# Patient Record
Sex: FEMALE | Race: WHITE | Employment: OTHER | URBAN - METROPOLITAN AREA
[De-identification: names, ages, dates, MRNs, and addresses within clinical notes are randomized per-mention and may not be internally consistent; named-entity substitution may affect disease eponyms.]

---

## 2017-02-23 ENCOUNTER — OFFICE VISIT (OUTPATIENT)
Dept: ORTHOPEDIC SURGERY | Age: 42
End: 2017-02-23

## 2017-02-23 DIAGNOSIS — S52.532A CLOSED COLLES' FRACTURE OF LEFT RADIUS, INITIAL ENCOUNTER: ICD-10-CM

## 2017-02-23 DIAGNOSIS — M25.532 WRIST PAIN, LEFT: Primary | ICD-10-CM

## 2017-02-23 DIAGNOSIS — G56.02 LEFT CARPAL TUNNEL SYNDROME: ICD-10-CM

## 2017-02-23 PROCEDURE — 99203 OFFICE O/P NEW LOW 30 MIN: CPT | Performed by: ORTHOPAEDIC SURGERY

## 2017-02-23 PROCEDURE — 73110 X-RAY EXAM OF WRIST: CPT | Performed by: ORTHOPAEDIC SURGERY

## 2017-02-26 RX ORDER — SODIUM CHLORIDE, SODIUM LACTATE, POTASSIUM CHLORIDE, CALCIUM CHLORIDE 600; 310; 30; 20 MG/100ML; MG/100ML; MG/100ML; MG/100ML
INJECTION, SOLUTION INTRAVENOUS CONTINUOUS
Status: CANCELLED | OUTPATIENT
Start: 2017-02-26

## 2017-02-26 RX ORDER — LIDOCAINE HYDROCHLORIDE 10 MG/ML
2 INJECTION, SOLUTION INFILTRATION; PERINEURAL
Status: CANCELLED | OUTPATIENT
Start: 2017-02-26 | End: 2017-02-26

## 2017-02-27 ENCOUNTER — HOSPITAL ENCOUNTER (OUTPATIENT)
Dept: OTHER | Age: 42
Discharge: HOME | End: 2017-02-27
Attending: FAMILY MEDICINE | Admitting: FAMILY MEDICINE

## 2017-02-27 ENCOUNTER — HOSPITAL ENCOUNTER (OUTPATIENT)
Dept: SURGERY | Age: 42
Discharge: HOME | End: 2017-02-27
Attending: ORTHOPAEDIC SURGERY | Admitting: ORTHOPAEDIC SURGERY

## 2017-02-27 LAB
ALBUMIN SERPL-MCNC: 4 G/DL (ref 3.4–5)
ANION GAP SERPL CALCULATED.3IONS-SCNC: 15 MMOL/L (ref 3–16)
BUN BLDV-MCNC: 5 MG/DL (ref 7–20)
CALCIUM SERPL-MCNC: 9.1 MG/DL (ref 8.3–10.6)
CHLORIDE BLD-SCNC: 98 MMOL/L (ref 99–110)
CHOLESTEROL, TOTAL: 283 MG/DL (ref 0–199)
CO2: 25 MMOL/L (ref 21–32)
CREAT SERPL-MCNC: <0.5 MG/DL (ref 0.6–1.1)
CREATININE URINE: 68.9 MG/DL (ref 28–259)
GFR AFRICAN AMERICAN: >60
GFR NON-AFRICAN AMERICAN: >60
GLUCOSE BLD-MCNC: 349 MG/DL (ref 70–99)
HDLC SERPL-MCNC: 33 MG/DL (ref 40–60)
LDL CHOLESTEROL CALCULATED: ABNORMAL MG/DL
LDL CHOLESTEROL DIRECT: 189 MG/DL
MICROALBUMIN UR-MCNC: 5.3 MG/DL
MICROALBUMIN/CREAT UR-RTO: 76.9 MG/G (ref 0–30)
PHOSPHORUS: 3.4 MG/DL (ref 2.5–4.9)
POTASSIUM SERPL-SCNC: 3.7 MMOL/L (ref 3.5–5.1)
SODIUM BLD-SCNC: 138 MMOL/L (ref 136–145)
TRIGL SERPL-MCNC: 527 MG/DL (ref 0–150)
VLDLC SERPL CALC-MCNC: ABNORMAL MG/DL

## 2017-02-27 RX ORDER — OXYCODONE HYDROCHLORIDE AND ACETAMINOPHEN 5; 325 MG/1; MG/1
1 TABLET ORAL EVERY 6 HOURS PRN
Qty: 30 TABLET | Refills: 0 | Status: SHIPPED | OUTPATIENT
Start: 2017-02-27 | End: 2017-03-06

## 2017-02-28 LAB
ESTIMATED AVERAGE GLUCOSE: 309.2 MG/DL
HBA1C MFR BLD: 12.4 %

## 2017-03-01 ENCOUNTER — TELEPHONE (OUTPATIENT)
Dept: ORTHOPEDIC SURGERY | Age: 42
End: 2017-03-01

## 2017-03-01 ENCOUNTER — HOSPITAL ENCOUNTER (OUTPATIENT)
Dept: SURGERY | Age: 42
Discharge: HOME | End: 2017-03-01
Attending: ORTHOPAEDIC SURGERY | Admitting: ORTHOPAEDIC SURGERY

## 2017-03-01 VITALS
OXYGEN SATURATION: 94 % | SYSTOLIC BLOOD PRESSURE: 146 MMHG | TEMPERATURE: 97.5 F | BODY MASS INDEX: 38.27 KG/M2 | HEIGHT: 63 IN | DIASTOLIC BLOOD PRESSURE: 102 MMHG | WEIGHT: 216 LBS | RESPIRATION RATE: 12 BRPM | HEART RATE: 104 BPM

## 2017-03-01 LAB
GLUCOSE BLD-MCNC: 252 MG/DL (ref 70–99)
GLUCOSE BLD-MCNC: 300 MG/DL (ref 70–99)
PERFORMED ON: ABNORMAL
PERFORMED ON: ABNORMAL

## 2017-03-01 RX ORDER — LIDOCAINE HYDROCHLORIDE 10 MG/ML
2 INJECTION, SOLUTION INFILTRATION; PERINEURAL
Status: DISCONTINUED | OUTPATIENT
Start: 2017-03-01 | End: 2017-03-01

## 2017-03-01 RX ORDER — SODIUM CHLORIDE, SODIUM LACTATE, POTASSIUM CHLORIDE, CALCIUM CHLORIDE 600; 310; 30; 20 MG/100ML; MG/100ML; MG/100ML; MG/100ML
INJECTION, SOLUTION INTRAVENOUS CONTINUOUS
Status: DISCONTINUED | OUTPATIENT
Start: 2017-03-01 | End: 2017-03-01

## 2017-03-01 RX ORDER — SODIUM CHLORIDE 0.9 % (FLUSH) 0.9 %
10 SYRINGE (ML) INJECTION EVERY 12 HOURS SCHEDULED
Status: DISCONTINUED | OUTPATIENT
Start: 2017-03-01 | End: 2017-03-02 | Stop reason: HOSPADM

## 2017-03-01 RX ORDER — ONDANSETRON 4 MG/1
4 TABLET, FILM COATED ORAL EVERY 8 HOURS PRN
Qty: 30 TABLET | Refills: 2 | Status: SHIPPED | OUTPATIENT
Start: 2017-03-01 | End: 2018-01-22

## 2017-03-01 RX ORDER — MEPERIDINE HYDROCHLORIDE 50 MG/ML
12.5 INJECTION INTRAMUSCULAR; INTRAVENOUS; SUBCUTANEOUS EVERY 5 MIN PRN
Status: DISCONTINUED | OUTPATIENT
Start: 2017-03-01 | End: 2017-03-02 | Stop reason: HOSPADM

## 2017-03-01 RX ORDER — LIDOCAINE HYDROCHLORIDE 10 MG/ML
1 INJECTION, SOLUTION EPIDURAL; INFILTRATION; INTRACAUDAL; PERINEURAL
Status: ACTIVE | OUTPATIENT
Start: 2017-03-01 | End: 2017-03-01

## 2017-03-01 RX ORDER — OXYCODONE HYDROCHLORIDE AND ACETAMINOPHEN 5; 325 MG/1; MG/1
1-2 TABLET ORAL EVERY 6 HOURS PRN
Qty: 40 TABLET | Refills: 0 | Status: SHIPPED | OUTPATIENT
Start: 2017-03-01 | End: 2017-03-08

## 2017-03-01 RX ORDER — OXYCODONE HYDROCHLORIDE AND ACETAMINOPHEN 5; 325 MG/1; MG/1
1 TABLET ORAL PRN
Status: COMPLETED | OUTPATIENT
Start: 2017-03-01 | End: 2017-03-01

## 2017-03-01 RX ORDER — OXYCODONE HYDROCHLORIDE AND ACETAMINOPHEN 5; 325 MG/1; MG/1
2 TABLET ORAL PRN
Status: COMPLETED | OUTPATIENT
Start: 2017-03-01 | End: 2017-03-01

## 2017-03-01 RX ORDER — HYDRALAZINE HYDROCHLORIDE 20 MG/ML
5 INJECTION INTRAMUSCULAR; INTRAVENOUS EVERY 10 MIN PRN
Status: DISCONTINUED | OUTPATIENT
Start: 2017-03-01 | End: 2017-03-02 | Stop reason: HOSPADM

## 2017-03-01 RX ORDER — ONDANSETRON 2 MG/ML
4 INJECTION INTRAMUSCULAR; INTRAVENOUS EVERY 10 MIN PRN
Status: DISCONTINUED | OUTPATIENT
Start: 2017-03-01 | End: 2017-03-02 | Stop reason: HOSPADM

## 2017-03-01 RX ORDER — SODIUM CHLORIDE, SODIUM LACTATE, POTASSIUM CHLORIDE, CALCIUM CHLORIDE 600; 310; 30; 20 MG/100ML; MG/100ML; MG/100ML; MG/100ML
INJECTION, SOLUTION INTRAVENOUS CONTINUOUS
Status: DISCONTINUED | OUTPATIENT
Start: 2017-03-01 | End: 2017-03-02 | Stop reason: HOSPADM

## 2017-03-01 RX ORDER — SODIUM CHLORIDE 0.9 % (FLUSH) 0.9 %
10 SYRINGE (ML) INJECTION PRN
Status: DISCONTINUED | OUTPATIENT
Start: 2017-03-01 | End: 2017-03-02 | Stop reason: HOSPADM

## 2017-03-01 RX ORDER — LABETALOL HYDROCHLORIDE 5 MG/ML
5 INJECTION, SOLUTION INTRAVENOUS EVERY 10 MIN PRN
Status: DISCONTINUED | OUTPATIENT
Start: 2017-03-01 | End: 2017-03-02 | Stop reason: HOSPADM

## 2017-03-01 RX ADMIN — Medication 0.25 MG: at 13:49

## 2017-03-01 RX ADMIN — Medication 0.25 MG: at 12:38

## 2017-03-01 RX ADMIN — SODIUM CHLORIDE, SODIUM LACTATE, POTASSIUM CHLORIDE, CALCIUM CHLORIDE: 600; 310; 30; 20 INJECTION, SOLUTION INTRAVENOUS at 10:21

## 2017-03-01 RX ADMIN — Medication 0.25 MG: at 12:43

## 2017-03-01 RX ADMIN — Medication 0.25 MG: at 13:14

## 2017-03-01 RX ADMIN — OXYCODONE HYDROCHLORIDE AND ACETAMINOPHEN 2 TABLET: 5; 325 TABLET ORAL at 14:01

## 2017-03-01 ASSESSMENT — PAIN SCALES - GENERAL
PAINLEVEL_OUTOF10: 6
PAINLEVEL_OUTOF10: 7
PAINLEVEL_OUTOF10: 8
PAINLEVEL_OUTOF10: 6

## 2017-03-01 ASSESSMENT — PAIN - FUNCTIONAL ASSESSMENT: PAIN_FUNCTIONAL_ASSESSMENT: 0-10

## 2017-03-02 DIAGNOSIS — S52.532A CLOSED COLLES' FRACTURE OF LEFT RADIUS, INITIAL ENCOUNTER: Primary | ICD-10-CM

## 2017-03-02 DIAGNOSIS — G56.02 LEFT CARPAL TUNNEL SYNDROME: ICD-10-CM

## 2017-03-09 ENCOUNTER — OFFICE VISIT (OUTPATIENT)
Dept: ORTHOPEDIC SURGERY | Age: 42
End: 2017-03-09

## 2017-03-09 ENCOUNTER — HOSPITAL ENCOUNTER (OUTPATIENT)
Dept: OCCUPATIONAL THERAPY | Age: 42
Discharge: HOME | End: 2017-02-28
Admitting: ORTHOPAEDIC SURGERY

## 2017-03-09 ENCOUNTER — HOSPITAL ENCOUNTER (OUTPATIENT)
Dept: OCCUPATIONAL THERAPY | Age: 42
Discharge: HOME | End: 2017-03-09
Admitting: ORTHOPAEDIC SURGERY

## 2017-03-09 DIAGNOSIS — S52.532S CLOSED COLLES' FRACTURE OF LEFT RADIUS, SEQUELA: Primary | ICD-10-CM

## 2017-03-09 PROCEDURE — 73110 X-RAY EXAM OF WRIST: CPT | Performed by: ORTHOPAEDIC SURGERY

## 2017-03-09 PROCEDURE — 99024 POSTOP FOLLOW-UP VISIT: CPT | Performed by: ORTHOPAEDIC SURGERY

## 2017-03-09 RX ORDER — CEPHALEXIN 500 MG/1
500 CAPSULE ORAL 4 TIMES DAILY
Qty: 28 CAPSULE | Refills: 0 | Status: SHIPPED | OUTPATIENT
Start: 2017-03-09 | End: 2017-03-16

## 2017-03-09 RX ORDER — OXYCODONE HYDROCHLORIDE AND ACETAMINOPHEN 5; 325 MG/1; MG/1
1 TABLET ORAL EVERY 6 HOURS PRN
Qty: 30 TABLET | Refills: 0 | Status: SHIPPED | OUTPATIENT
Start: 2017-03-09 | End: 2017-03-16

## 2017-04-06 ENCOUNTER — TELEPHONE (OUTPATIENT)
Dept: ORTHOPEDIC SURGERY | Age: 42
End: 2017-04-06

## 2017-04-11 ENCOUNTER — OFFICE VISIT (OUTPATIENT)
Dept: ORTHOPEDIC SURGERY | Age: 42
End: 2017-04-11

## 2017-04-11 VITALS
BODY MASS INDEX: 37.73 KG/M2 | SYSTOLIC BLOOD PRESSURE: 138 MMHG | WEIGHT: 212.96 LBS | DIASTOLIC BLOOD PRESSURE: 79 MMHG | HEIGHT: 63 IN

## 2017-04-11 DIAGNOSIS — S52.532S CLOSED COLLES' FRACTURE OF LEFT RADIUS, SEQUELA: Primary | ICD-10-CM

## 2017-04-11 PROCEDURE — 73110 X-RAY EXAM OF WRIST: CPT | Performed by: ORTHOPAEDIC SURGERY

## 2017-04-11 PROCEDURE — 99024 POSTOP FOLLOW-UP VISIT: CPT | Performed by: ORTHOPAEDIC SURGERY

## 2017-04-11 RX ORDER — IBUPROFEN 600 MG/1
600 TABLET ORAL EVERY 6 HOURS PRN
Qty: 60 TABLET | Refills: 2 | Status: SHIPPED | OUTPATIENT
Start: 2017-04-11 | End: 2018-01-22

## 2018-06-05 PROBLEM — E10.10 DKA, TYPE 1, NOT AT GOAL (HCC): Status: ACTIVE | Noted: 2018-06-05

## 2018-06-05 PROBLEM — J18.9 PNA (PNEUMONIA): Status: ACTIVE | Noted: 2018-06-05

## 2018-07-13 ENCOUNTER — HOSPITAL ENCOUNTER (OUTPATIENT)
Dept: MAMMOGRAPHY | Age: 43
Discharge: HOME | End: 2018-07-14
Attending: FAMILY MEDICINE | Admitting: FAMILY MEDICINE

## 2018-07-13 DIAGNOSIS — Z12.31 VISIT FOR SCREENING MAMMOGRAM: ICD-10-CM

## 2018-09-24 ENCOUNTER — HOSPITAL ENCOUNTER (OUTPATIENT)
Dept: MRI IMAGING | Age: 43
Discharge: HOME | End: 2018-09-24
Payer: COMMERCIAL

## 2018-09-24 DIAGNOSIS — M54.41 ACUTE BACK PAIN WITH SCIATICA, RIGHT: ICD-10-CM

## 2018-09-24 DIAGNOSIS — G89.29 CHRONIC BILATERAL LOW BACK PAIN WITH BILATERAL SCIATICA: ICD-10-CM

## 2018-09-24 DIAGNOSIS — M54.42 CHRONIC BILATERAL LOW BACK PAIN WITH BILATERAL SCIATICA: ICD-10-CM

## 2018-09-24 DIAGNOSIS — M54.41 CHRONIC LOW BACK PAIN WITH BILATERAL SCIATICA, UNSPECIFIED BACK PAIN LATERALITY: ICD-10-CM

## 2018-09-24 DIAGNOSIS — M54.42 CHRONIC LOW BACK PAIN WITH BILATERAL SCIATICA, UNSPECIFIED BACK PAIN LATERALITY: ICD-10-CM

## 2018-09-24 DIAGNOSIS — G89.29 CHRONIC LOW BACK PAIN WITH BILATERAL SCIATICA, UNSPECIFIED BACK PAIN LATERALITY: ICD-10-CM

## 2018-09-24 DIAGNOSIS — M54.41 CHRONIC BILATERAL LOW BACK PAIN WITH BILATERAL SCIATICA: ICD-10-CM

## 2018-09-24 PROCEDURE — 72148 MRI LUMBAR SPINE W/O DYE: CPT

## 2018-09-30 ENCOUNTER — APPOINTMENT (OUTPATIENT)
Dept: CT IMAGING | Age: 43
End: 2018-09-30
Payer: COMMERCIAL

## 2018-09-30 ENCOUNTER — HOSPITAL ENCOUNTER (EMERGENCY)
Age: 43
Discharge: HOME | End: 2018-09-30
Payer: COMMERCIAL

## 2018-09-30 VITALS
SYSTOLIC BLOOD PRESSURE: 128 MMHG | OXYGEN SATURATION: 100 % | DIASTOLIC BLOOD PRESSURE: 84 MMHG | TEMPERATURE: 98.2 F | BODY MASS INDEX: 38.27 KG/M2 | HEART RATE: 70 BPM | HEIGHT: 63 IN | RESPIRATION RATE: 16 BRPM | WEIGHT: 216 LBS

## 2018-09-30 DIAGNOSIS — L03.319 CELLULITIS OF PUBIC REGION: Primary | ICD-10-CM

## 2018-09-30 PROCEDURE — 99283 EMERGENCY DEPT VISIT LOW MDM: CPT

## 2018-09-30 PROCEDURE — 6360000002 HC RX W HCPCS: Performed by: PHYSICIAN ASSISTANT

## 2018-09-30 PROCEDURE — 72192 CT PELVIS W/O DYE: CPT

## 2018-09-30 PROCEDURE — 6370000000 HC RX 637 (ALT 250 FOR IP): Performed by: PHYSICIAN ASSISTANT

## 2018-09-30 RX ORDER — HYDROCODONE BITARTRATE AND ACETAMINOPHEN 5; 325 MG/1; MG/1
1 TABLET ORAL EVERY 6 HOURS PRN
Qty: 10 TABLET | Refills: 0 | Status: SHIPPED | OUTPATIENT
Start: 2018-09-30 | End: 2018-10-07

## 2018-09-30 RX ORDER — DOXYCYCLINE HYCLATE 100 MG
100 TABLET ORAL ONCE
Status: COMPLETED | OUTPATIENT
Start: 2018-09-30 | End: 2018-09-30

## 2018-09-30 RX ORDER — ONDANSETRON 4 MG/1
4 TABLET, ORALLY DISINTEGRATING ORAL ONCE
Status: COMPLETED | OUTPATIENT
Start: 2018-09-30 | End: 2018-09-30

## 2018-09-30 RX ORDER — DOXYCYCLINE 100 MG/1
100 TABLET ORAL 2 TIMES DAILY
Qty: 20 TABLET | Refills: 0 | Status: SHIPPED | OUTPATIENT
Start: 2018-09-30 | End: 2018-10-10

## 2018-09-30 RX ORDER — PROMETHAZINE HYDROCHLORIDE 25 MG/1
25 TABLET ORAL EVERY 6 HOURS PRN
Qty: 20 TABLET | Refills: 0 | Status: SHIPPED | OUTPATIENT
Start: 2018-09-30 | End: 2018-10-07

## 2018-09-30 RX ORDER — HYDROCODONE BITARTRATE AND ACETAMINOPHEN 5; 325 MG/1; MG/1
1 TABLET ORAL ONCE
Status: COMPLETED | OUTPATIENT
Start: 2018-09-30 | End: 2018-09-30

## 2018-09-30 RX ADMIN — DOXYCYCLINE HYCLATE 100 MG: 100 TABLET, COATED ORAL at 22:48

## 2018-09-30 RX ADMIN — ONDANSETRON 4 MG: 4 TABLET, ORALLY DISINTEGRATING ORAL at 20:42

## 2018-09-30 RX ADMIN — HYDROCODONE BITARTRATE AND ACETAMINOPHEN 1 TABLET: 5; 325 TABLET ORAL at 20:42

## 2018-09-30 ASSESSMENT — PAIN SCALES - GENERAL: PAINLEVEL_OUTOF10: 8

## 2018-10-03 ASSESSMENT — ENCOUNTER SYMPTOMS
NAUSEA: 0
DIARRHEA: 0
ABDOMINAL PAIN: 0
CONSTIPATION: 0
COUGH: 0
BACK PAIN: 0
EYE PAIN: 0
CHEST TIGHTNESS: 0
ROS SKIN COMMENTS: LESION
FACIAL SWELLING: 0
RHINORRHEA: 0
SORE THROAT: 0
SHORTNESS OF BREATH: 0
EYE REDNESS: 0

## 2018-10-09 ENCOUNTER — INITIAL CONSULT (OUTPATIENT)
Dept: SURGERY | Age: 43
End: 2018-10-09
Payer: COMMERCIAL

## 2018-10-09 ENCOUNTER — PREP FOR PROCEDURE (OUTPATIENT)
Dept: SURGERY | Age: 43
End: 2018-10-09

## 2018-10-09 VITALS
SYSTOLIC BLOOD PRESSURE: 124 MMHG | WEIGHT: 209 LBS | DIASTOLIC BLOOD PRESSURE: 76 MMHG | BODY MASS INDEX: 37.03 KG/M2 | HEIGHT: 63 IN

## 2018-10-09 DIAGNOSIS — M79.89 SOFT TISSUE MASS: Primary | ICD-10-CM

## 2018-10-09 PROCEDURE — 99242 OFF/OP CONSLTJ NEW/EST SF 20: CPT | Performed by: SURGERY

## 2018-10-09 PROCEDURE — G8417 CALC BMI ABV UP PARAM F/U: HCPCS | Performed by: SURGERY

## 2018-10-09 PROCEDURE — G8484 FLU IMMUNIZE NO ADMIN: HCPCS | Performed by: SURGERY

## 2018-10-09 PROCEDURE — G8427 DOCREV CUR MEDS BY ELIG CLIN: HCPCS | Performed by: SURGERY

## 2018-10-09 RX ORDER — SODIUM CHLORIDE 0.9 % (FLUSH) 0.9 %
10 SYRINGE (ML) INJECTION PRN
Status: CANCELLED | OUTPATIENT
Start: 2018-10-09 | End: 2019-10-09

## 2018-10-09 RX ORDER — SODIUM CHLORIDE 0.9 % (FLUSH) 0.9 %
10 SYRINGE (ML) INJECTION EVERY 12 HOURS SCHEDULED
Status: CANCELLED | OUTPATIENT
Start: 2018-10-09 | End: 2019-10-09

## 2018-10-09 NOTE — PROGRESS NOTES
124/76   Weight: 209 lb (94.8 kg)   Height: 5' 2.99\" (1.6 m)     Body mass index is 37.03 kg/m². Wt Readings from Last 3 Encounters:   10/09/18 209 lb (94.8 kg)   09/30/18 216 lb (98 kg)   06/08/18 195 lb 6 oz (88.6 kg)     BP Readings from Last 3 Encounters:   10/09/18 124/76   09/30/18 128/84   06/08/18 (!) 153/90        ROS:  As per HPI, otherwise reviewed ×10 systems and negative    PE:  Constitutional:  Well developed, well nourished, no acute distress, non-toxic appearance   Eyes:  PERRL, conjunctiva normal   HENT:  Atraumatic, external ears normal, nose normal. Neck- normal range of motion, no tenderness, supple   Respiratory:  No respiratory distress, normal breath sounds, no rales, no wheezing   Cardiovascular:  Normal rate, normal rhythm  Integument:  Pubic area demonstrates a 2 x 4 cm firm, mobile soft tissue mass  Lymphatic:  No lymphadenopathy noted   Neurologic:  Alert & oriented x 3, no focal deficits noted   Psychiatric:  Speech and behavior appropriate       DATA:  Radiology Review:  CT images reviewed and show  Findings consistent with cellulitis along the rightward aspect of the mons   pubis.  Patchy areas in the involved subcutaneous soft tissues measuring up   to 19 x 17 x 8 mm, and may represent phlegmons or early abscess formation. Assessment:  1. 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 We will plan for excision of the mass. I explained the procedure including risks, benefits, and alternatives. Questions were answered and the patient agrees to proceed.

## 2018-10-15 ENCOUNTER — ANESTHESIA EVENT (OUTPATIENT)
Dept: OPERATING ROOM | Age: 43
End: 2018-10-15
Payer: COMMERCIAL

## 2018-10-15 NOTE — ANESTHESIA PRE PROCEDURE
And Related Hives    Risperidone And Related      hallucinate    Ibuprofen Nausea And Vomiting     11/1/2012-pt reports she gets hives and vomits when she takes it.     Ketorolac Tromethamine Other (See Comments), Hives and Nausea And Vomiting    Nsaids Hives and Nausea And Vomiting     11/2/12 PT STATES SHE GETS HIVES AND VOMITS WHEN SHE TAKES IT    Ultram [Tramadol] Hives, Nausea And Vomiting and Nausea Only       Social History   Substance Use Topics    Smoking status: Never Smoker    Smokeless tobacco: Never Used    Alcohol use No       LABS:    CBC  Lab Results   Component Value Date/Time    WBC 6.5 06/07/2018 05:42 AM    HGB 11.2 (L) 06/07/2018 05:42 AM    HCT 32.8 (L) 06/07/2018 05:42 AM     06/07/2018 05:42 AM     RENAL  Lab Results   Component Value Date/Time     10/16/2018 12:27 PM    K 3.5 10/16/2018 12:27 PM    K 3.5 06/07/2018 05:42 AM     10/16/2018 12:27 PM    CO2 23 10/16/2018 12:27 PM    BUN 10 10/16/2018 12:27 PM    CREATININE 0.6 10/16/2018 12:27 PM    GLUCOSE 129 (H) 10/16/2018 12:27 PM     COAGS  No results found for: PROTIME, INR, APTT      Latricia Brown MD   10/15/2018

## 2018-10-16 ENCOUNTER — HOSPITAL ENCOUNTER (OUTPATIENT)
Age: 43
Setting detail: OUTPATIENT SURGERY
Discharge: HOME | End: 2018-10-16
Attending: SURGERY | Admitting: SURGERY
Payer: COMMERCIAL

## 2018-10-16 ENCOUNTER — ANESTHESIA (OUTPATIENT)
Dept: OPERATING ROOM | Age: 43
End: 2018-10-16
Payer: COMMERCIAL

## 2018-10-16 VITALS
DIASTOLIC BLOOD PRESSURE: 65 MMHG | OXYGEN SATURATION: 98 % | SYSTOLIC BLOOD PRESSURE: 98 MMHG | RESPIRATION RATE: 13 BRPM

## 2018-10-16 VITALS
HEART RATE: 88 BPM | WEIGHT: 210 LBS | RESPIRATION RATE: 16 BRPM | OXYGEN SATURATION: 94 % | DIASTOLIC BLOOD PRESSURE: 87 MMHG | BODY MASS INDEX: 37.21 KG/M2 | SYSTOLIC BLOOD PRESSURE: 127 MMHG | HEIGHT: 63 IN | TEMPERATURE: 97.4 F

## 2018-10-16 DIAGNOSIS — M79.89 SOFT TISSUE MASS: Primary | ICD-10-CM

## 2018-10-16 LAB
ANION GAP SERPL CALCULATED.3IONS-SCNC: 16 MMOL/L (ref 3–16)
BUN BLDV-MCNC: 10 MG/DL (ref 7–20)
CALCIUM SERPL-MCNC: 9.7 MG/DL (ref 8.3–10.6)
CHLORIDE BLD-SCNC: 100 MMOL/L (ref 99–110)
CO2: 23 MMOL/L (ref 21–32)
CREAT SERPL-MCNC: 0.6 MG/DL (ref 0.6–1.1)
GFR AFRICAN AMERICAN: >60
GFR NON-AFRICAN AMERICAN: >60
GLUCOSE BLD-MCNC: 108 MG/DL (ref 70–99)
GLUCOSE BLD-MCNC: 129 MG/DL (ref 70–99)
PERFORMED ON: ABNORMAL
POTASSIUM SERPL-SCNC: 3.5 MMOL/L (ref 3.5–5.1)
SODIUM BLD-SCNC: 139 MMOL/L (ref 136–145)

## 2018-10-16 PROCEDURE — 2500000003 HC RX 250 WO HCPCS: Performed by: NURSE ANESTHETIST, CERTIFIED REGISTERED

## 2018-10-16 PROCEDURE — 6360000002 HC RX W HCPCS: Performed by: NURSE ANESTHETIST, CERTIFIED REGISTERED

## 2018-10-16 PROCEDURE — 2580000003 HC RX 258: Performed by: ANESTHESIOLOGY

## 2018-10-16 PROCEDURE — 6360000002 HC RX W HCPCS: Performed by: ANESTHESIOLOGY

## 2018-10-16 PROCEDURE — 3600000012 HC SURGERY LEVEL 2 ADDTL 15MIN: Performed by: SURGERY

## 2018-10-16 PROCEDURE — 2500000003 HC RX 250 WO HCPCS: Performed by: SURGERY

## 2018-10-16 PROCEDURE — 3700000001 HC ADD 15 MINUTES (ANESTHESIA): Performed by: SURGERY

## 2018-10-16 PROCEDURE — 6370000000 HC RX 637 (ALT 250 FOR IP): Performed by: ANESTHESIOLOGY

## 2018-10-16 PROCEDURE — 88307 TISSUE EXAM BY PATHOLOGIST: CPT

## 2018-10-16 PROCEDURE — 27045 EXC HIP/PELV TUM DEEP 5 CM/>: CPT | Performed by: SURGERY

## 2018-10-16 PROCEDURE — 2500000003 HC RX 250 WO HCPCS: Performed by: ANESTHESIOLOGY

## 2018-10-16 PROCEDURE — 36415 COLL VENOUS BLD VENIPUNCTURE: CPT

## 2018-10-16 PROCEDURE — 93010 ELECTROCARDIOGRAM REPORT: CPT | Performed by: INTERNAL MEDICINE

## 2018-10-16 PROCEDURE — 7100000010 HC PHASE II RECOVERY - FIRST 15 MIN: Performed by: SURGERY

## 2018-10-16 PROCEDURE — 2580000003 HC RX 258: Performed by: NURSE ANESTHETIST, CERTIFIED REGISTERED

## 2018-10-16 PROCEDURE — 3600000002 HC SURGERY LEVEL 2 BASE: Performed by: SURGERY

## 2018-10-16 PROCEDURE — 80048 BASIC METABOLIC PNL TOTAL CA: CPT

## 2018-10-16 PROCEDURE — 2709999900 HC NON-CHARGEABLE SUPPLY: Performed by: SURGERY

## 2018-10-16 PROCEDURE — 2580000003 HC RX 258: Performed by: SURGERY

## 2018-10-16 PROCEDURE — 6360000002 HC RX W HCPCS: Performed by: SURGERY

## 2018-10-16 PROCEDURE — 93005 ELECTROCARDIOGRAM TRACING: CPT | Performed by: SURGERY

## 2018-10-16 PROCEDURE — 7100000011 HC PHASE II RECOVERY - ADDTL 15 MIN: Performed by: SURGERY

## 2018-10-16 PROCEDURE — 3700000000 HC ANESTHESIA ATTENDED CARE: Performed by: SURGERY

## 2018-10-16 RX ORDER — APREPITANT 40 MG/1
40 CAPSULE ORAL ONCE
Status: COMPLETED | OUTPATIENT
Start: 2018-10-16 | End: 2018-10-16

## 2018-10-16 RX ORDER — MEPERIDINE HYDROCHLORIDE 25 MG/ML
12.5 INJECTION INTRAMUSCULAR; INTRAVENOUS; SUBCUTANEOUS EVERY 5 MIN PRN
Status: DISCONTINUED | OUTPATIENT
Start: 2018-10-16 | End: 2018-10-16 | Stop reason: HOSPADM

## 2018-10-16 RX ORDER — MAGNESIUM HYDROXIDE 1200 MG/15ML
LIQUID ORAL CONTINUOUS PRN
Status: DISCONTINUED | OUTPATIENT
Start: 2018-10-16 | End: 2018-10-16 | Stop reason: HOSPADM

## 2018-10-16 RX ORDER — HYDRALAZINE HYDROCHLORIDE 20 MG/ML
5 INJECTION INTRAMUSCULAR; INTRAVENOUS EVERY 10 MIN PRN
Status: DISCONTINUED | OUTPATIENT
Start: 2018-10-16 | End: 2018-10-16 | Stop reason: HOSPADM

## 2018-10-16 RX ORDER — OXYCODONE HYDROCHLORIDE AND ACETAMINOPHEN 5; 325 MG/1; MG/1
2 TABLET ORAL PRN
Status: DISCONTINUED | OUTPATIENT
Start: 2018-10-16 | End: 2018-10-16 | Stop reason: HOSPADM

## 2018-10-16 RX ORDER — PROMETHAZINE HYDROCHLORIDE 25 MG/ML
6.25 INJECTION, SOLUTION INTRAMUSCULAR; INTRAVENOUS
Status: DISCONTINUED | OUTPATIENT
Start: 2018-10-16 | End: 2018-10-16 | Stop reason: HOSPADM

## 2018-10-16 RX ORDER — LIDOCAINE HYDROCHLORIDE 20 MG/ML
INJECTION, SOLUTION INFILTRATION; PERINEURAL PRN
Status: DISCONTINUED | OUTPATIENT
Start: 2018-10-16 | End: 2018-10-16 | Stop reason: SDUPTHER

## 2018-10-16 RX ORDER — SODIUM CHLORIDE, SODIUM LACTATE, POTASSIUM CHLORIDE, CALCIUM CHLORIDE 600; 310; 30; 20 MG/100ML; MG/100ML; MG/100ML; MG/100ML
INJECTION, SOLUTION INTRAVENOUS CONTINUOUS
Status: DISCONTINUED | OUTPATIENT
Start: 2018-10-16 | End: 2018-10-16 | Stop reason: HOSPADM

## 2018-10-16 RX ORDER — OXYCODONE HYDROCHLORIDE AND ACETAMINOPHEN 5; 325 MG/1; MG/1
1 TABLET ORAL PRN
Status: DISCONTINUED | OUTPATIENT
Start: 2018-10-16 | End: 2018-10-16 | Stop reason: HOSPADM

## 2018-10-16 RX ORDER — OXYCODONE HYDROCHLORIDE AND ACETAMINOPHEN 5; 325 MG/1; MG/1
1 TABLET ORAL EVERY 6 HOURS PRN
Qty: 28 TABLET | Refills: 0 | Status: SHIPPED | OUTPATIENT
Start: 2018-10-16 | End: 2018-10-23

## 2018-10-16 RX ORDER — ONDANSETRON 2 MG/ML
INJECTION INTRAMUSCULAR; INTRAVENOUS PRN
Status: DISCONTINUED | OUTPATIENT
Start: 2018-10-16 | End: 2018-10-16 | Stop reason: SDUPTHER

## 2018-10-16 RX ORDER — BUPIVACAINE HYDROCHLORIDE 5 MG/ML
INJECTION, SOLUTION EPIDURAL; INTRACAUDAL PRN
Status: DISCONTINUED | OUTPATIENT
Start: 2018-10-16 | End: 2018-10-16 | Stop reason: HOSPADM

## 2018-10-16 RX ORDER — HYDROMORPHONE HCL 110MG/55ML
0.25 PATIENT CONTROLLED ANALGESIA SYRINGE INTRAVENOUS EVERY 5 MIN PRN
Status: DISCONTINUED | OUTPATIENT
Start: 2018-10-16 | End: 2018-10-16 | Stop reason: HOSPADM

## 2018-10-16 RX ORDER — ONDANSETRON 2 MG/ML
4 INJECTION INTRAMUSCULAR; INTRAVENOUS PRN
Status: DISCONTINUED | OUTPATIENT
Start: 2018-10-16 | End: 2018-10-16 | Stop reason: HOSPADM

## 2018-10-16 RX ORDER — SODIUM CHLORIDE 0.9 % (FLUSH) 0.9 %
10 SYRINGE (ML) INJECTION PRN
Status: DISCONTINUED | OUTPATIENT
Start: 2018-10-16 | End: 2018-10-16 | Stop reason: HOSPADM

## 2018-10-16 RX ORDER — SODIUM CHLORIDE 0.9 % (FLUSH) 0.9 %
10 SYRINGE (ML) INJECTION EVERY 12 HOURS SCHEDULED
Status: DISCONTINUED | OUTPATIENT
Start: 2018-10-16 | End: 2018-10-16 | Stop reason: HOSPADM

## 2018-10-16 RX ORDER — SCOLOPAMINE TRANSDERMAL SYSTEM 1 MG/1
1 PATCH, EXTENDED RELEASE TRANSDERMAL ONCE
Status: DISCONTINUED | OUTPATIENT
Start: 2018-10-16 | End: 2018-10-16 | Stop reason: HOSPADM

## 2018-10-16 RX ORDER — DIPHENHYDRAMINE HYDROCHLORIDE 50 MG/ML
12.5 INJECTION INTRAMUSCULAR; INTRAVENOUS
Status: DISCONTINUED | OUTPATIENT
Start: 2018-10-16 | End: 2018-10-16 | Stop reason: HOSPADM

## 2018-10-16 RX ORDER — SODIUM CHLORIDE, SODIUM LACTATE, POTASSIUM CHLORIDE, CALCIUM CHLORIDE 600; 310; 30; 20 MG/100ML; MG/100ML; MG/100ML; MG/100ML
INJECTION, SOLUTION INTRAVENOUS CONTINUOUS PRN
Status: DISCONTINUED | OUTPATIENT
Start: 2018-10-16 | End: 2018-10-16 | Stop reason: SDUPTHER

## 2018-10-16 RX ORDER — PROPOFOL 10 MG/ML
INJECTION, EMULSION INTRAVENOUS PRN
Status: DISCONTINUED | OUTPATIENT
Start: 2018-10-16 | End: 2018-10-16 | Stop reason: SDUPTHER

## 2018-10-16 RX ORDER — FENTANYL CITRATE 50 UG/ML
INJECTION, SOLUTION INTRAMUSCULAR; INTRAVENOUS PRN
Status: DISCONTINUED | OUTPATIENT
Start: 2018-10-16 | End: 2018-10-16 | Stop reason: SDUPTHER

## 2018-10-16 RX ORDER — LABETALOL HYDROCHLORIDE 5 MG/ML
5 INJECTION, SOLUTION INTRAVENOUS EVERY 10 MIN PRN
Status: DISCONTINUED | OUTPATIENT
Start: 2018-10-16 | End: 2018-10-16 | Stop reason: HOSPADM

## 2018-10-16 RX ORDER — HYDROMORPHONE HCL 110MG/55ML
0.5 PATIENT CONTROLLED ANALGESIA SYRINGE INTRAVENOUS EVERY 5 MIN PRN
Status: DISCONTINUED | OUTPATIENT
Start: 2018-10-16 | End: 2018-10-16 | Stop reason: HOSPADM

## 2018-10-16 RX ORDER — LIDOCAINE HYDROCHLORIDE 10 MG/ML
0.3 INJECTION, SOLUTION EPIDURAL; INFILTRATION; INTRACAUDAL; PERINEURAL
Status: COMPLETED | OUTPATIENT
Start: 2018-10-16 | End: 2018-10-16

## 2018-10-16 RX ORDER — MIDAZOLAM HYDROCHLORIDE 1 MG/ML
INJECTION INTRAMUSCULAR; INTRAVENOUS PRN
Status: DISCONTINUED | OUTPATIENT
Start: 2018-10-16 | End: 2018-10-16 | Stop reason: SDUPTHER

## 2018-10-16 RX ADMIN — LIDOCAINE HYDROCHLORIDE 40 MG: 20 INJECTION, SOLUTION INFILTRATION; PERINEURAL at 14:17

## 2018-10-16 RX ADMIN — SODIUM CHLORIDE, POTASSIUM CHLORIDE, SODIUM LACTATE AND CALCIUM CHLORIDE: 600; 310; 30; 20 INJECTION, SOLUTION INTRAVENOUS at 14:13

## 2018-10-16 RX ADMIN — LIDOCAINE HYDROCHLORIDE 0.3 ML: 10 INJECTION, SOLUTION EPIDURAL; INFILTRATION; INTRACAUDAL; PERINEURAL at 13:01

## 2018-10-16 RX ADMIN — PROPOFOL 20 MG: 10 INJECTION, EMULSION INTRAVENOUS at 14:34

## 2018-10-16 RX ADMIN — PROPOFOL 50 MG: 10 INJECTION, EMULSION INTRAVENOUS at 14:21

## 2018-10-16 RX ADMIN — ONDANSETRON 4 MG: 2 INJECTION, SOLUTION INTRAMUSCULAR; INTRAVENOUS at 14:30

## 2018-10-16 RX ADMIN — PROPOFOL 20 MG: 10 INJECTION, EMULSION INTRAVENOUS at 14:25

## 2018-10-16 RX ADMIN — SODIUM CHLORIDE, POTASSIUM CHLORIDE, SODIUM LACTATE AND CALCIUM CHLORIDE: 600; 310; 30; 20 INJECTION, SOLUTION INTRAVENOUS at 13:01

## 2018-10-16 RX ADMIN — APREPITANT 40 MG: 40 CAPSULE ORAL at 12:52

## 2018-10-16 RX ADMIN — MIDAZOLAM 2 MG: 1 INJECTION INTRAMUSCULAR; INTRAVENOUS at 14:17

## 2018-10-16 RX ADMIN — CEFAZOLIN SODIUM 2 G: 2 SOLUTION INTRAVENOUS at 14:19

## 2018-10-16 RX ADMIN — PROPOFOL 30 MG: 10 INJECTION, EMULSION INTRAVENOUS at 14:28

## 2018-10-16 RX ADMIN — PROPOFOL 50 MG: 10 INJECTION, EMULSION INTRAVENOUS at 14:17

## 2018-10-16 RX ADMIN — PROPOFOL 30 MG: 10 INJECTION, EMULSION INTRAVENOUS at 14:30

## 2018-10-16 RX ADMIN — CEFAZOLIN SODIUM 2 G: 2 SOLUTION INTRAVENOUS at 14:11

## 2018-10-16 RX ADMIN — FENTANYL CITRATE 50 MCG: 50 INJECTION INTRAMUSCULAR; INTRAVENOUS at 14:17

## 2018-10-16 ASSESSMENT — PULMONARY FUNCTION TESTS
PIF_VALUE: 0
PIF_VALUE: 1
PIF_VALUE: 5
PIF_VALUE: 0
PIF_VALUE: 1
PIF_VALUE: 13
PIF_VALUE: 1
PIF_VALUE: 1
PIF_VALUE: 0
PIF_VALUE: 1
PIF_VALUE: 0
PIF_VALUE: 15
PIF_VALUE: 1
PIF_VALUE: 2
PIF_VALUE: 0
PIF_VALUE: 0
PIF_VALUE: 1
PIF_VALUE: 0
PIF_VALUE: 2
PIF_VALUE: 1
PIF_VALUE: 0
PIF_VALUE: 1
PIF_VALUE: 0
PIF_VALUE: 0
PIF_VALUE: 1

## 2018-10-16 ASSESSMENT — PAIN - FUNCTIONAL ASSESSMENT: PAIN_FUNCTIONAL_ASSESSMENT: 0-10

## 2018-10-16 ASSESSMENT — PAIN SCALES - GENERAL: PAINLEVEL_OUTOF10: 0

## 2018-10-16 ASSESSMENT — PAIN DESCRIPTION - DESCRIPTORS: DESCRIPTORS: ACHING;CONSTANT;DISCOMFORT

## 2018-10-16 NOTE — BRIEF OP NOTE
Brief Postoperative Note  ______________________________________________________________    Patient: Norm Median  YOB: 1975  MRN: [id number]  Date of Procedure: 10/16/2018    Pre-Op Diagnosis: PUBIC MASS    Post-Op Diagnosis: Same       Procedure(s):  EXCISION PUBIC MASS    Anesthesia: General    Surgeon(s):  Javi Restrepo MD    Staff:  Surgical Assistant: Jean Carlos Johnson Person First: Concepcion Saini     Estimated Blood Loss: 25 ml    Complications: None    Specimens:   ID Type Source Tests Collected by Time Destination   A :  Tissue Lesion SURGICAL PATHOLOGY Javi Restrepo MD 10/16/2018 1427        Implants:  * No implants in log *      Drains:      Findings: as above    Patricia Hernández MD  Date: 10/16/2018  Time: 2:38 PM

## 2018-10-16 NOTE — PROGRESS NOTES
Signed in. Side rails up x 2.
surgery. 14. If you have a Living Will and Durable Power of  for Healthcare, please bring in a copy. 13. Notify your Surgeon if you develop any illness between now and surgery  time, cough, cold, fever, sore throat, nausea, vomiting, etc.  Please notify your surgeon if you experience dizziness, shortness of breath or blurred vision between now & the time of your surgery   16. DO NOT shave your operative site 96 hours prior to surgery. For face & neck surgery, men may use an electric razor 48 hours prior to surgery. 17. Shower with _x__Antibacterial soap (x_chlorhexidine for total joint  Pt's) shower two times before surgery.(the morning of and the night before. 18. To provide excellent care visitors will be limited to one in the room at any given time.   Please call pre admission testing if you any further questions [phone] or [phone]

## 2018-10-17 LAB
EKG ATRIAL RATE: 92 BPM
EKG DIAGNOSIS: NORMAL
EKG P AXIS: 47 DEGREES
EKG P-R INTERVAL: 154 MS
EKG Q-T INTERVAL: 362 MS
EKG QRS DURATION: 94 MS
EKG QTC CALCULATION (BAZETT): 447 MS
EKG R AXIS: 14 DEGREES
EKG T AXIS: 64 DEGREES
EKG VENTRICULAR RATE: 92 BPM

## 2018-10-18 ENCOUNTER — TELEPHONE (OUTPATIENT)
Dept: SURGERY | Age: 43
End: 2018-10-18

## 2018-11-14 ENCOUNTER — HOSPITAL ENCOUNTER (INPATIENT)
Age: 43
LOS: 5 days | Discharge: HOME | DRG: 752 | End: 2018-11-19
Attending: EMERGENCY MEDICINE | Admitting: PSYCHIATRY & NEUROLOGY
Payer: COMMERCIAL

## 2018-11-14 DIAGNOSIS — F60.3 BORDERLINE PERSONALITY DISORDER (HCC): ICD-10-CM

## 2018-11-14 DIAGNOSIS — B37.49 YEAST UTI: ICD-10-CM

## 2018-11-14 DIAGNOSIS — R73.9 HYPERGLYCEMIA: ICD-10-CM

## 2018-11-14 DIAGNOSIS — R45.851 SUICIDAL IDEATION: Primary | ICD-10-CM

## 2018-11-14 DIAGNOSIS — F43.10 POSTTRAUMATIC STRESS DISORDER: ICD-10-CM

## 2018-11-14 DIAGNOSIS — F32.A DEPRESSION, UNSPECIFIED DEPRESSION TYPE: ICD-10-CM

## 2018-11-14 LAB
A/G RATIO: 1 (ref 1.1–2.2)
ACETAMINOPHEN LEVEL: <5 UG/ML (ref 10–30)
ALBUMIN SERPL-MCNC: 4.2 G/DL (ref 3.4–5)
ALP BLD-CCNC: 169 U/L (ref 40–129)
ALT SERPL-CCNC: 31 U/L (ref 10–40)
AMPHETAMINE SCREEN, URINE: NORMAL
ANION GAP SERPL CALCULATED.3IONS-SCNC: 13 MMOL/L (ref 3–16)
AST SERPL-CCNC: 14 U/L (ref 15–37)
BACTERIA: ABNORMAL /HPF
BARBITURATE SCREEN URINE: NORMAL
BASOPHILS ABSOLUTE: 0 K/UL (ref 0–0.2)
BASOPHILS RELATIVE PERCENT: 0.4 %
BENZODIAZEPINE SCREEN, URINE: NORMAL
BILIRUB SERPL-MCNC: 0.4 MG/DL (ref 0–1)
BILIRUBIN URINE: NEGATIVE
BLOOD, URINE: ABNORMAL
BUN BLDV-MCNC: 13 MG/DL (ref 7–20)
CALCIUM SERPL-MCNC: 9.5 MG/DL (ref 8.3–10.6)
CANNABINOID SCREEN URINE: NORMAL
CASTS: ABNORMAL /LPF
CHLORIDE BLD-SCNC: 90 MMOL/L (ref 99–110)
CLARITY: CLEAR
CO2: 26 MMOL/L (ref 21–32)
COCAINE METABOLITE SCREEN URINE: NORMAL
COLOR: YELLOW
CREAT SERPL-MCNC: <0.5 MG/DL (ref 0.6–1.1)
EOSINOPHILS ABSOLUTE: 0.2 K/UL (ref 0–0.6)
EOSINOPHILS RELATIVE PERCENT: 2.7 %
EPITHELIAL CELLS, UA: ABNORMAL /HPF
ETHANOL: NORMAL MG/DL (ref 0–0.08)
GFR AFRICAN AMERICAN: >60
GFR NON-AFRICAN AMERICAN: >60
GLOBULIN: 4.4 G/DL
GLUCOSE BLD-MCNC: 310 MG/DL (ref 70–99)
GLUCOSE BLD-MCNC: 346 MG/DL (ref 70–99)
GLUCOSE BLD-MCNC: 408 MG/DL (ref 70–99)
GLUCOSE BLD-MCNC: 447 MG/DL (ref 70–99)
GLUCOSE URINE: >=1000 MG/DL
HCG(URINE) PREGNANCY TEST: NEGATIVE
HCT VFR BLD CALC: 40.7 % (ref 36–48)
HEMOGLOBIN: 13.9 G/DL (ref 12–16)
KETONES, URINE: NEGATIVE MG/DL
LEUKOCYTE ESTERASE, URINE: NEGATIVE
LYMPHOCYTES ABSOLUTE: 3.7 K/UL (ref 1–5.1)
LYMPHOCYTES RELATIVE PERCENT: 41.7 %
Lab: NORMAL
MCH RBC QN AUTO: 31.2 PG (ref 26–34)
MCHC RBC AUTO-ENTMCNC: 34.1 G/DL (ref 31–36)
MCV RBC AUTO: 91.4 FL (ref 80–100)
METHADONE SCREEN, URINE: NORMAL
MICROSCOPIC EXAMINATION: YES
MONOCYTES ABSOLUTE: 0.4 K/UL (ref 0–1.3)
MONOCYTES RELATIVE PERCENT: 5.1 %
NEUTROPHILS ABSOLUTE: 4.4 K/UL (ref 1.7–7.7)
NEUTROPHILS RELATIVE PERCENT: 50.1 %
NITRITE, URINE: NEGATIVE
OPIATE SCREEN URINE: NORMAL
OXYCODONE URINE: NORMAL
PDW BLD-RTO: 13.5 % (ref 12.4–15.4)
PERFORMED ON: ABNORMAL
PH UA: 6
PH UA: 6
PHENCYCLIDINE SCREEN URINE: NORMAL
PLATELET # BLD: 228 K/UL (ref 135–450)
PMV BLD AUTO: 8.8 FL (ref 5–10.5)
POTASSIUM SERPL-SCNC: 3.7 MMOL/L (ref 3.5–5.1)
PROPOXYPHENE SCREEN: NORMAL
PROTEIN UA: 30 MG/DL
RBC # BLD: 4.45 M/UL (ref 4–5.2)
RBC UA: ABNORMAL /HPF (ref 0–2)
SALICYLATE, SERUM: <0.3 MG/DL (ref 15–30)
SODIUM BLD-SCNC: 129 MMOL/L (ref 136–145)
SPECIFIC GRAVITY UA: 1.02
TOTAL PROTEIN: 8.6 G/DL (ref 6.4–8.2)
URINE TYPE: ABNORMAL
UROBILINOGEN, URINE: 0.2 E.U./DL
WBC # BLD: 8.9 K/UL (ref 4–11)
WBC UA: ABNORMAL /HPF (ref 0–5)
YEAST: PRESENT /HPF

## 2018-11-14 PROCEDURE — G0480 DRUG TEST DEF 1-7 CLASSES: HCPCS

## 2018-11-14 PROCEDURE — 80053 COMPREHEN METABOLIC PANEL: CPT

## 2018-11-14 PROCEDURE — 83036 HEMOGLOBIN GLYCOSYLATED A1C: CPT

## 2018-11-14 PROCEDURE — 85025 COMPLETE CBC W/AUTO DIFF WBC: CPT

## 2018-11-14 PROCEDURE — 6370000000 HC RX 637 (ALT 250 FOR IP): Performed by: NURSE PRACTITIONER

## 2018-11-14 PROCEDURE — 6370000000 HC RX 637 (ALT 250 FOR IP): Performed by: EMERGENCY MEDICINE

## 2018-11-14 PROCEDURE — 81001 URINALYSIS AUTO W/SCOPE: CPT

## 2018-11-14 PROCEDURE — 99285 EMERGENCY DEPT VISIT HI MDM: CPT

## 2018-11-14 PROCEDURE — 96372 THER/PROPH/DIAG INJ SC/IM: CPT

## 2018-11-14 PROCEDURE — 1240000000 HC EMOTIONAL WELLNESS R&B

## 2018-11-14 PROCEDURE — 80307 DRUG TEST PRSMV CHEM ANLYZR: CPT

## 2018-11-14 PROCEDURE — 36415 COLL VENOUS BLD VENIPUNCTURE: CPT

## 2018-11-14 PROCEDURE — 84703 CHORIONIC GONADOTROPIN ASSAY: CPT

## 2018-11-14 RX ORDER — CETIRIZINE HYDROCHLORIDE 10 MG/1
10 TABLET ORAL DAILY
Status: DISCONTINUED | OUTPATIENT
Start: 2018-11-14 | End: 2018-11-19 | Stop reason: HOSPADM

## 2018-11-14 RX ORDER — OMEPRAZOLE 20 MG/1
20 CAPSULE, DELAYED RELEASE ORAL DAILY
COMMUNITY

## 2018-11-14 RX ORDER — NICOTINE POLACRILEX 4 MG
15 LOZENGE BUCCAL PRN
Status: DISCONTINUED | OUTPATIENT
Start: 2018-11-14 | End: 2018-11-19 | Stop reason: HOSPADM

## 2018-11-14 RX ORDER — MAGNESIUM HYDROXIDE/ALUMINUM HYDROXICE/SIMETHICONE 120; 1200; 1200 MG/30ML; MG/30ML; MG/30ML
30 SUSPENSION ORAL EVERY 6 HOURS PRN
Status: DISCONTINUED | OUTPATIENT
Start: 2018-11-14 | End: 2018-11-19 | Stop reason: HOSPADM

## 2018-11-14 RX ORDER — FAMOTIDINE 40 MG/1
40 TABLET, FILM COATED ORAL NIGHTLY PRN
COMMUNITY
End: 2018-11-14

## 2018-11-14 RX ORDER — GABAPENTIN 300 MG/1
600 CAPSULE ORAL 3 TIMES DAILY
Status: DISCONTINUED | OUTPATIENT
Start: 2018-11-14 | End: 2018-11-19 | Stop reason: HOSPADM

## 2018-11-14 RX ORDER — QUETIAPINE FUMARATE 25 MG/1
50 TABLET, FILM COATED ORAL 2 TIMES DAILY
Status: DISCONTINUED | OUTPATIENT
Start: 2018-11-14 | End: 2018-11-15

## 2018-11-14 RX ORDER — HYDROXYZINE PAMOATE 50 MG/1
50 CAPSULE ORAL 3 TIMES DAILY PRN
Status: DISCONTINUED | OUTPATIENT
Start: 2018-11-14 | End: 2018-11-19 | Stop reason: HOSPADM

## 2018-11-14 RX ORDER — INSULIN GLARGINE 100 [IU]/ML
80 INJECTION, SOLUTION SUBCUTANEOUS NIGHTLY
Status: DISCONTINUED | OUTPATIENT
Start: 2018-11-14 | End: 2018-11-15

## 2018-11-14 RX ORDER — PANTOPRAZOLE SODIUM 40 MG/1
40 TABLET, DELAYED RELEASE ORAL
Status: DISCONTINUED | OUTPATIENT
Start: 2018-11-15 | End: 2018-11-17

## 2018-11-14 RX ORDER — ACETAMINOPHEN 325 MG/1
650 TABLET ORAL EVERY 4 HOURS PRN
Status: DISCONTINUED | OUTPATIENT
Start: 2018-11-14 | End: 2018-11-19 | Stop reason: HOSPADM

## 2018-11-14 RX ORDER — FLUCONAZOLE 100 MG/1
200 TABLET ORAL ONCE
Status: COMPLETED | OUTPATIENT
Start: 2018-11-14 | End: 2018-11-14

## 2018-11-14 RX ORDER — ATORVASTATIN CALCIUM 40 MG/1
40 TABLET, FILM COATED ORAL NIGHTLY
COMMUNITY

## 2018-11-14 RX ORDER — ATORVASTATIN CALCIUM 40 MG/1
40 TABLET, FILM COATED ORAL NIGHTLY
Status: DISCONTINUED | OUTPATIENT
Start: 2018-11-14 | End: 2018-11-19 | Stop reason: HOSPADM

## 2018-11-14 RX ORDER — OXYCODONE HYDROCHLORIDE AND ACETAMINOPHEN 5; 325 MG/1; MG/1
1 TABLET ORAL EVERY 8 HOURS PRN
COMMUNITY
End: 2018-11-14

## 2018-11-14 RX ORDER — CETIRIZINE HYDROCHLORIDE 10 MG/1
10 TABLET ORAL DAILY
COMMUNITY

## 2018-11-14 RX ORDER — DIMETHICONE, OXYBENZONE, AND PADIMATE O 2; 2.5; 6.6 G/100G; G/100G; G/100G
1 STICK TOPICAL PRN
Status: DISCONTINUED | OUTPATIENT
Start: 2018-11-14 | End: 2018-11-19 | Stop reason: HOSPADM

## 2018-11-14 RX ADMIN — INSULIN LISPRO 2 UNITS: 100 INJECTION, SOLUTION INTRAVENOUS; SUBCUTANEOUS at 22:14

## 2018-11-14 RX ADMIN — INSULIN LISPRO 40 UNITS: 100 INJECTION, SUSPENSION SUBCUTANEOUS at 15:30

## 2018-11-14 RX ADMIN — GABAPENTIN 600 MG: 300 CAPSULE ORAL at 21:37

## 2018-11-14 RX ADMIN — ATORVASTATIN CALCIUM 40 MG: 40 TABLET, FILM COATED ORAL at 21:38

## 2018-11-14 RX ADMIN — INSULIN HUMAN 10 UNITS: 100 INJECTION, SOLUTION PARENTERAL at 15:29

## 2018-11-14 RX ADMIN — DOXEPIN HYDROCHLORIDE 150 MG: 100 CAPSULE ORAL at 21:35

## 2018-11-14 RX ADMIN — FLUCONAZOLE 200 MG: 100 TABLET ORAL at 15:29

## 2018-11-14 RX ADMIN — QUETIAPINE FUMARATE 50 MG: 25 TABLET ORAL at 21:38

## 2018-11-14 RX ADMIN — INSULIN GLARGINE 80 UNITS: 100 INJECTION, SOLUTION SUBCUTANEOUS at 22:17

## 2018-11-14 ASSESSMENT — SLEEP AND FATIGUE QUESTIONNAIRES
DO YOU USE A SLEEP AID: YES
DIFFICULTY FALLING ASLEEP: YES
SLEEP PATTERN: DIFFICULTY FALLING ASLEEP;DISTURBED/INTERRUPTED SLEEP;INSOMNIA;RESTLESSNESS;NIGHTMARES/TERRORS
DO YOU HAVE DIFFICULTY SLEEPING: YES
RESTFUL SLEEP: NO
DIFFICULTY STAYING ASLEEP: YES

## 2018-11-14 ASSESSMENT — LIFESTYLE VARIABLES: HISTORY_ALCOHOL_USE: NO

## 2018-11-14 NOTE — ED PROVIDER NOTES
 HYSTERECTOMY      [place] OFFICE/OUTPT VISIT,PROCEDURE ONLY N/A 10/16/2018    EXCISION PUBIC MASS performed by Livia Doherty MD at [address] Left 03/01/2017    Open reduction internal fixation     Family History   Problem Relation Age of Onset    Adopted: Yes    Heart Disease Mother      Social History     Social History    Marital status:      Spouse name: N/A    Number of children: 2    Years of education: 15     Occupational History    Not on file. Social History Main Topics    Smoking status: Never Smoker    Smokeless tobacco: Never Used    Alcohol use No    Drug use: No      Comment: hx IV heroin-clean 3 years    Sexual activity: Not Currently     Other Topics Concern    Not on file     Social History Narrative    No narrative on file     Current Facility-Administered Medications   Medication Dose Route Frequency Provider Last Rate Last Dose    insulin lispro protamine & lispro (HUMALOG MIX) (75-25) 100 UNIT per ML injection vial SUSP 40 Units  40 Units Subcutaneous BID  Carlita Perez MD   40 Units at 11/14/18 1530     Current Outpatient Prescriptions   Medication Sig Dispense Refill    insulin glargine (BASAGLAR KWIKPEN) 100 UNIT/ML injection pen Inject 80 Units into the skin nightly      atorvastatin (LIPITOR) 40 MG tablet Take 40 mg by mouth nightly      omeprazole (PRILOSEC) 20 MG delayed release capsule Take 20 mg by mouth daily      cetirizine (ZYRTEC) 10 MG tablet Take 10 mg by mouth daily      famotidine (PEPCID) 40 MG tablet Take 40 mg by mouth nightly as needed      oxyCODONE-acetaminophen (PERCOCET) 5-325 MG per tablet Take 1 tablet by mouth every 8 hours as needed for Pain. .      albuterol sulfate HFA (PROVENTIL HFA) 108 (90 Base) MCG/ACT inhaler Inhale 2 puffs into the lungs every 6 hours as needed for Wheezing 1 Inhaler 0    QUEtiapine (SEROQUEL) 25 MG tablet Take 50 mg by mouth 2 times daily 50 mg at 9am and 2 pm;  600 mg nightly.  Canagliflozin (INVOKANA PO) Take 100 mg by mouth nightly       insulin aspart (NOVOLOG) 100 UNIT/ML injection vial Inject 40 Units into the skin 3 times daily (before meals)       insulin detemir (LEVEMIR) 100 UNIT/ML injection vial Inject into the skin nightly      DOXEPIN HCL PO Take 150 mg by mouth nightly       gabapentin (NEURONTIN) 300 MG capsule Take 2 capsules by mouth 3 times daily (Patient taking differently: Take 800 mg by mouth 3 times daily. Mamta Rodriguez ) 90 capsule 0    QUEtiapine (SEROQUEL) 300 MG tablet Take 2 tablets by mouth nightly (Patient taking differently: Take 600 mg by mouth nightly 50 mg at 9am and 2 pm;  600 mg nightly.) 30 tablet 0     Allergies   Allergen Reactions    Ketorolac Tromethamine Hives    Morphine And Related Hives    Risperidone And Related      hallucinate    Ibuprofen Nausea And Vomiting     11/1/2012-pt reports she gets hives and vomits when she takes it.  Ketorolac Tromethamine Other (See Comments), Hives and Nausea And Vomiting    Morphine Itching and Rash    Nsaids Hives and Nausea And Vomiting     11/2/12 PT STATES SHE GETS HIVES AND VOMITS WHEN SHE TAKES IT    Ultram [Tramadol] Hives, Nausea And Vomiting and Nausea Only       Nursing Notes Reviewed    Physical Exam:  ED Triage Vitals [11/14/18 1252]   Enc Vitals Group      BP (!) 134/99      Pulse 103      Resp 16      Temp 97.8 °F (36.6 °C)      Temp Source Oral      SpO2 98 %      Weight 213 lb (96.6 kg)      Height 5' 3\" (1.6 m)      Head Circumference       Peak Flow       Pain Score       Pain Loc       Pain Edu? Excl. in [address]? My pulse ox interpretation is - normal    General appearance:  No acute distress. Skin:  Warm. Dry. Eye:  Extraocular movements intact. Ears, nose, mouth and throat:  Oral mucosa moist   Neck:  Trachea midline. Extremity:  No swelling.   Normal ROM     Heart:  Regular rate and rhythm, normal S1 & S2,   Perfusion:  intact  Respiratory:

## 2018-11-15 PROBLEM — R73.9 HYPERGLYCEMIA: Status: RESOLVED | Noted: 2018-11-15 | Resolved: 2018-11-15

## 2018-11-15 PROBLEM — E10.10 DKA, TYPE 1, NOT AT GOAL (HCC): Status: RESOLVED | Noted: 2018-06-05 | Resolved: 2018-11-15

## 2018-11-15 PROBLEM — K21.9 GERD (GASTROESOPHAGEAL REFLUX DISEASE): Status: ACTIVE | Noted: 2018-11-15

## 2018-11-15 PROBLEM — G56.02 LEFT CARPAL TUNNEL SYNDROME: Status: RESOLVED | Noted: 2017-02-23 | Resolved: 2018-11-15

## 2018-11-15 PROBLEM — G47.09 OTHER INSOMNIA: Status: ACTIVE | Noted: 2018-11-15

## 2018-11-15 PROBLEM — S52.532A CLOSED COLLES' FRACTURE OF LEFT RADIUS: Status: RESOLVED | Noted: 2017-02-23 | Resolved: 2018-11-15

## 2018-11-15 PROBLEM — J18.9 PNA (PNEUMONIA): Status: RESOLVED | Noted: 2018-06-05 | Resolved: 2018-11-15

## 2018-11-15 PROBLEM — R73.9 HYPERGLYCEMIA: Status: ACTIVE | Noted: 2018-11-15

## 2018-11-15 PROBLEM — E78.5 HLD (HYPERLIPIDEMIA): Status: ACTIVE | Noted: 2018-11-15

## 2018-11-15 LAB
GLUCOSE BLD-MCNC: 307 MG/DL (ref 70–99)
GLUCOSE BLD-MCNC: 308 MG/DL (ref 70–99)
GLUCOSE BLD-MCNC: 360 MG/DL (ref 70–99)
GLUCOSE BLD-MCNC: 376 MG/DL (ref 70–99)
PERFORMED ON: ABNORMAL

## 2018-11-15 PROCEDURE — 99222 1ST HOSP IP/OBS MODERATE 55: CPT | Performed by: PHYSICIAN ASSISTANT

## 2018-11-15 PROCEDURE — 1240000000 HC EMOTIONAL WELLNESS R&B

## 2018-11-15 PROCEDURE — 90792 PSYCH DIAG EVAL W/MED SRVCS: CPT | Performed by: NURSE PRACTITIONER

## 2018-11-15 PROCEDURE — 6370000000 HC RX 637 (ALT 250 FOR IP): Performed by: NURSE PRACTITIONER

## 2018-11-15 PROCEDURE — 6370000000 HC RX 637 (ALT 250 FOR IP): Performed by: PHYSICIAN ASSISTANT

## 2018-11-15 PROCEDURE — 6370000000 HC RX 637 (ALT 250 FOR IP): Performed by: EMERGENCY MEDICINE

## 2018-11-15 RX ORDER — INSULIN GLARGINE 100 [IU]/ML
85 INJECTION, SOLUTION SUBCUTANEOUS NIGHTLY
Status: DISCONTINUED | OUTPATIENT
Start: 2018-11-15 | End: 2018-11-15

## 2018-11-15 RX ORDER — QUETIAPINE FUMARATE 200 MG/1
200 TABLET, FILM COATED ORAL NIGHTLY
Status: DISCONTINUED | OUTPATIENT
Start: 2018-11-15 | End: 2018-11-17

## 2018-11-15 RX ORDER — QUETIAPINE FUMARATE 25 MG/1
50 TABLET, FILM COATED ORAL DAILY
Status: DISCONTINUED | OUTPATIENT
Start: 2018-11-16 | End: 2018-11-19 | Stop reason: HOSPADM

## 2018-11-15 RX ORDER — INSULIN GLARGINE 100 [IU]/ML
80 INJECTION, SOLUTION SUBCUTANEOUS NIGHTLY
Status: DISCONTINUED | OUTPATIENT
Start: 2018-11-15 | End: 2018-11-19 | Stop reason: HOSPADM

## 2018-11-15 RX ORDER — PRAZOSIN HYDROCHLORIDE 1 MG/1
2 CAPSULE ORAL NIGHTLY
Status: DISCONTINUED | OUTPATIENT
Start: 2018-11-15 | End: 2018-11-19 | Stop reason: HOSPADM

## 2018-11-15 RX ADMIN — INSULIN LISPRO 5 UNITS: 100 INJECTION, SOLUTION INTRAVENOUS; SUBCUTANEOUS at 17:32

## 2018-11-15 RX ADMIN — QUETIAPINE FUMARATE 200 MG: 200 TABLET ORAL at 21:18

## 2018-11-15 RX ADMIN — ACETAMINOPHEN 650 MG: 325 TABLET ORAL at 02:06

## 2018-11-15 RX ADMIN — CETIRIZINE HYDROCHLORIDE 10 MG: 10 TABLET ORAL at 08:38

## 2018-11-15 RX ADMIN — GABAPENTIN 600 MG: 300 CAPSULE ORAL at 08:38

## 2018-11-15 RX ADMIN — INSULIN LISPRO 4 UNITS: 100 INJECTION, SOLUTION INTRAVENOUS; SUBCUTANEOUS at 08:34

## 2018-11-15 RX ADMIN — HYDROXYZINE PAMOATE 50 MG: 50 CAPSULE ORAL at 21:18

## 2018-11-15 RX ADMIN — ACETAMINOPHEN 650 MG: 325 TABLET ORAL at 21:17

## 2018-11-15 RX ADMIN — INSULIN LISPRO 40 UNITS: 100 INJECTION, SOLUTION INTRAVENOUS; SUBCUTANEOUS at 17:33

## 2018-11-15 RX ADMIN — ATORVASTATIN CALCIUM 40 MG: 40 TABLET, FILM COATED ORAL at 21:18

## 2018-11-15 RX ADMIN — ACETAMINOPHEN 650 MG: 325 TABLET ORAL at 08:37

## 2018-11-15 RX ADMIN — CETIRIZINE HYDROCHLORIDE 10 MG: 10 TABLET ORAL at 02:06

## 2018-11-15 RX ADMIN — DOXEPIN HYDROCHLORIDE 150 MG: 100 CAPSULE ORAL at 21:18

## 2018-11-15 RX ADMIN — GABAPENTIN 600 MG: 300 CAPSULE ORAL at 21:17

## 2018-11-15 RX ADMIN — PANTOPRAZOLE SODIUM 40 MG: 40 TABLET, DELAYED RELEASE ORAL at 06:14

## 2018-11-15 RX ADMIN — INSULIN LISPRO 5 UNITS: 100 INJECTION, SOLUTION INTRAVENOUS; SUBCUTANEOUS at 11:51

## 2018-11-15 RX ADMIN — INSULIN LISPRO 40 UNITS: 100 INJECTION, SUSPENSION SUBCUTANEOUS at 09:59

## 2018-11-15 RX ADMIN — INSULIN LISPRO 2 UNITS: 100 INJECTION, SOLUTION INTRAVENOUS; SUBCUTANEOUS at 20:23

## 2018-11-15 RX ADMIN — QUETIAPINE FUMARATE 50 MG: 25 TABLET ORAL at 08:38

## 2018-11-15 RX ADMIN — PRAZOSIN HYDROCHLORIDE 2 MG: 1 CAPSULE ORAL at 21:18

## 2018-11-15 RX ADMIN — INSULIN GLARGINE 80 UNITS: 100 INJECTION, SOLUTION SUBCUTANEOUS at 20:18

## 2018-11-15 RX ADMIN — HYDROXYZINE PAMOATE 50 MG: 50 CAPSULE ORAL at 02:06

## 2018-11-15 RX ADMIN — GABAPENTIN 600 MG: 300 CAPSULE ORAL at 13:18

## 2018-11-15 ASSESSMENT — PAIN DESCRIPTION - DESCRIPTORS
DESCRIPTORS: ACHING
DESCRIPTORS: ACHING
DESCRIPTORS: CONSTANT

## 2018-11-15 ASSESSMENT — LIFESTYLE VARIABLES: HISTORY_ALCOHOL_USE: NO

## 2018-11-15 ASSESSMENT — PAIN DESCRIPTION - PAIN TYPE
TYPE: CHRONIC PAIN

## 2018-11-15 ASSESSMENT — PAIN DESCRIPTION - LOCATION
LOCATION: HEAD

## 2018-11-15 ASSESSMENT — PAIN SCALES - GENERAL
PAINLEVEL_OUTOF10: 5
PAINLEVEL_OUTOF10: 0
PAINLEVEL_OUTOF10: 8
PAINLEVEL_OUTOF10: 7
PAINLEVEL_OUTOF10: 3
PAINLEVEL_OUTOF10: 8

## 2018-11-15 ASSESSMENT — SLEEP AND FATIGUE QUESTIONNAIRES
DO YOU HAVE DIFFICULTY SLEEPING: YES
DIFFICULTY STAYING ASLEEP: YES
DO YOU USE A SLEEP AID: YES
AVERAGE NUMBER OF SLEEP HOURS: 2
DIFFICULTY FALLING ASLEEP: YES
RESTFUL SLEEP: NO
DIFFICULTY ARISING: NO

## 2018-11-15 ASSESSMENT — PAIN DESCRIPTION - PROGRESSION
CLINICAL_PROGRESSION: GRADUALLY WORSENING
CLINICAL_PROGRESSION: NOT CHANGED
CLINICAL_PROGRESSION: GRADUALLY IMPROVING

## 2018-11-15 ASSESSMENT — PAIN DESCRIPTION - ONSET
ONSET: PROGRESSIVE
ONSET: ON-GOING
ONSET: PROGRESSIVE

## 2018-11-15 ASSESSMENT — PAIN DESCRIPTION - FREQUENCY
FREQUENCY: CONTINUOUS
FREQUENCY: INTERMITTENT
FREQUENCY: INTERMITTENT

## 2018-11-15 ASSESSMENT — PATIENT HEALTH QUESTIONNAIRE - PHQ9: SUM OF ALL RESPONSES TO PHQ QUESTIONS 1-9: 11

## 2018-11-15 NOTE — PRE-CERTIFICATION NOTE
[facility] pre-certification form completed and attempted to fax to [facility]. Fax failed, because the phone was busy. Attempted to fax multiple times.

## 2018-11-15 NOTE — PLAN OF CARE
Problem: Altered Mood, Depressive Behavior:  Goal: Able to verbalize and/or display a decrease in depressive symptoms  Able to verbalize and/or display a decrease in depressive symptoms   Outcome: Ongoing                                                                      Group Therapy Note    Date: 11/15/2018  Start Time: 2:30pm  End Time: 3:30pm  Number of Participants: 9    Type of Group: Psychoeducation    Patient's Goal: To increase movement and positive coping skills while engaging in Wii bowling with fellow group members. Notes: Pt attended group session. Pt did not engage in the game. However, pt observed. Status After Intervention:  Improved    Participation Level:  Active Listener and Interactive    Participation Quality: Appropriate, Attentive, Sharing and Supportive      Speech:  normal      Thought Process/Content: Logical      Affective Functioning: Congruent      Mood: anxious and depressed      Level of consciousness:  Alert and Oriented x4      Response to Learning: Able to retain information and Capable of insight      Endings: None Reported    Modes of Intervention: Education, Support, Socialization and Activity      Discipline Responsible: Psychoeducational Specialist      Signature:  Vladimir Ramirez

## 2018-11-15 NOTE — BH NOTE
Group Therapy Note    Date: 11/15/2018  Start Time: 1600  End Time:  1700  Number of Participants: 11    Type of Group: Healthy Living/Wellness    Wellness Binder Information  Module Name:  My Heart Map    Patient's Goal:  Patients will complete My Heart Map. Patients will be able to identify what is important to them, what they care about, and what they will continue to get better for. Status After Intervention:  Improved    Participation Level:  Active Listener and Interactive    Participation Quality: Appropriate and Sharing      Speech:  normal      Thought Process/Content: Logical  Linear      Affective Functioning: Congruent      Mood: euthymic      Level of consciousness:  Alert and Oriented x4      Response to Learning: Able to verbalize current knowledge/experience      Endings: None Reported    Modes of Intervention: Education, Socialization, Problem-solving and Activity      Discipline Responsible: Registered Nurse      Signature:  Christin Pina RN

## 2018-11-15 NOTE — PROGRESS NOTES
Suicidal Ideation: No  Present Homicidal Ideation: No    Tobacco Screening:  Practical Counseling, on admission, bekah X, if applicable and completed (first 3 are required if patient doesn't refuse):            ( )  Recognizing danger situations (included triggers and roadblocks)                    ( )  Coping skills (new ways to manage stress, exercise, relaxation techniques, changing routine, distraction)                                                           (X )  Basic information about quitting (benefits of quitting, techniques in how to quit, available resources  ( ) Referral for counseling faxed to [facility]                                           ( ) Patient refused counseling  ( ) Patient has not smoked in the last 30 days    Metabolic Screening:    Lab Results   Component Value Date    LABA1C 10.7 06/05/2018       No results for input(s): CHOL, TRIG, HDL, LDLCALC, LABVLDL in the last 72 hours. Body mass index is 37.73 kg/m². BP Readings from Last 2 Encounters:   11/14/18 117/77   10/16/18 98/65           Pt admitted with followings belongings:  Dentures: None  Vision - Corrective Lenses: None  Hearing Aid: None  Jewelry: Ring, Earrings, Necklace, Bracelet  Body Piercings Removed: N/A  Clothing: Pants, Shirt, Undergarments (Comment)  Were All Patient Medications Collected?: Yes  Other Valuables: Wallet, Purse (credit cards and phone  )      Patient oriented to surroundings and program expectations and copy of patient rights given. Received admission packet:  YES. Consents reviewed, signed YES. Refused NO. Patient verbalize understanding:  YES.     Patient education on precautions: Patt Thompson RN

## 2018-11-15 NOTE — H&P
Negative for hematuria   Musculoskeletal: Negative for arthralgias   Skin: Negative for rash   Neurological: Negative for syncope    Hematological: Negative for easy bruising/bleeding   Psychiatric/Behavorial: Per psychiatry team evaluation     PHYSICAL EXAM:    BP 98/65   Pulse 87   Temp 97.9 °F (36.6 °C) (Oral)   Resp 14   Ht 5' 3\" (1.6 m)   Wt 213 lb (96.6 kg)   SpO2 98%   BMI 37.73 kg/m²     Gen: obese middle aged female. No distress. Alert. Eyes: PERRL. No sclera icterus. No conjunctival injection. ENT: No discharge. Pharynx clear. Edentulous   Neck: No JVD. No Carotid Bruit. Trachea midline. Resp: No accessory muscle use. No crackles. No wheezes. No rhonchi. CV: Regular rate. Regular rhythm. No murmur. No rub. No edema. GI: Non-tender. Non-distended. Normal bowel sounds. Skin: Warm and dry. No nodule on exposed extremities. No rash on exposed extremities. M/S: No cyanosis. No joint deformity. No clubbing. Neuro: Awake. No focal neurologic deficit on exam.  Cranial nerves II through XII intact. Patient is able to ambulate without difficulty.   Psych: Per psychiatry team evaluation     CBC:   Recent Labs      11/14/18   1340   WBC  8.9   HGB  13.9   HCT  40.7   MCV  91.4   PLT  228     BMP:   Recent Labs      11/14/18   1340   NA  129*   K  3.7   CL  90*   CO2  26   BUN  13   CREATININE  <0.5*     LIVER PROFILE:   Recent Labs      11/14/18   1340   AST  14*   ALT  31   BILITOT  0.4   ALKPHOS  169*     UA:  Recent Labs      11/14/18   1452   COLORU  Yellow   PHUR  6.0  6.0   LABCAST  1-3 Hyaline*   WBCUA  3-5   RBCUA  3-5*   YEAST  Present*   BACTERIA  1+*   CLARITYU  Clear   SPECGRAV  1.020   LEUKOCYTESUR  Negative   UROBILINOGEN  0.2   BILIRUBINUR  Negative   BLOODU  TRACE-INTACT*   GLUCOSEU  >=1000*          U/A:    Lab Results   Component Value Date    COLORU Yellow 11/14/2018    WBCUA 3-5 11/14/2018    RBCUA 3-5 11/14/2018    MUCUS 1+ 03/04/2015    BACTERIA 1+ 11/14/2018    CLARITYU
appearance: [x] WNL [] disheveled [] poor hygiene []      Relatedness: [x] cooperative [] guarded [] indifferent [] hostile [] sedated    Speech:  [x] WNL [] pressured [] decreased volume [] increased volume [] delayed     Kinetics: [x] normal [] increased [] decreased    Mood: [x] reports \"manic\" [x] normal range [] irritable [] labile [] euphoric [] decreased range    Affect: [x] normal range [] blunted [] flat [] labile [] mood incongruent     Thought Process: [x] logical [] tangential [] delayed/TB [] disorganized []FOI [] concrete     Thought Content: [x] future oriented [] delusions [] guilt [] paranoid                               [] hopelessness  [] obsessive [] superficial     Delusions: [x] none [] grandiose [] paranoid  [] persecutory  [] somatic [] Congregational/spiritual    Hallucination Reported: [] None [x] auditory  [] visual  [] olfactory [] tactile  Observed RTIS:         [x] None [] Appears to be distracted by internal stimuli    Suicidal ideation: [x] denies [] endorses [] plan [] intent [] access  Homicidal ideation: [] denies [] endorses [] plan [] intent [] access    Insight: [] good [] fair [x] poor  [] none  Judgment: [] good [] fair [x] poor  [] impulsive     Attention and concentration: [x] intact [] limited [] impaired    Orientation: [x] person, place, time, situation [] disoriented to:       Memory: Remote [x]intact [] impaired                  Recent  [x]intact [] impaired    Diagnoses  1. Borderline Personality Disorder   2. IDDM   3. Insomnia   4. PTSD   5. Plan   Reviewed nursing and ancillary staff notes from last 24 hours. Evaluated medications and assessed for side effects and effectiveness. Assessed patient's educational needs including reviewing Plan of Care, medications and diagnosis.     Requires Inpatient Hospitalization as Least Restrictive Environment: YES    1. BPD/PTSD/Insomnia:  · Medications continued on admission: Doxepin 150 mg HS, Seroquel 50 mg BID  · Day 1:

## 2018-11-16 LAB
ESTIMATED AVERAGE GLUCOSE: 243.2 MG/DL
GLUCOSE BLD-MCNC: 210 MG/DL (ref 70–99)
GLUCOSE BLD-MCNC: 227 MG/DL (ref 70–99)
GLUCOSE BLD-MCNC: 267 MG/DL (ref 70–99)
GLUCOSE BLD-MCNC: 368 MG/DL (ref 70–99)
HBA1C MFR BLD: 10.1 %
PERFORMED ON: ABNORMAL

## 2018-11-16 PROCEDURE — 6370000000 HC RX 637 (ALT 250 FOR IP): Performed by: PHYSICIAN ASSISTANT

## 2018-11-16 PROCEDURE — 1240000000 HC EMOTIONAL WELLNESS R&B

## 2018-11-16 PROCEDURE — 6370000000 HC RX 637 (ALT 250 FOR IP): Performed by: NURSE PRACTITIONER

## 2018-11-16 PROCEDURE — 99233 SBSQ HOSP IP/OBS HIGH 50: CPT | Performed by: NURSE PRACTITIONER

## 2018-11-16 RX ORDER — BUSPIRONE HYDROCHLORIDE 10 MG/1
10 TABLET ORAL 2 TIMES DAILY
Status: DISCONTINUED | OUTPATIENT
Start: 2018-11-16 | End: 2018-11-19 | Stop reason: HOSPADM

## 2018-11-16 RX ADMIN — INSULIN LISPRO 40 UNITS: 100 INJECTION, SOLUTION INTRAVENOUS; SUBCUTANEOUS at 08:19

## 2018-11-16 RX ADMIN — DOXEPIN HYDROCHLORIDE 150 MG: 100 CAPSULE ORAL at 21:34

## 2018-11-16 RX ADMIN — BUSPIRONE HYDROCHLORIDE 10 MG: 10 TABLET ORAL at 21:34

## 2018-11-16 RX ADMIN — ACETAMINOPHEN 650 MG: 325 TABLET ORAL at 17:10

## 2018-11-16 RX ADMIN — GABAPENTIN 600 MG: 300 CAPSULE ORAL at 14:12

## 2018-11-16 RX ADMIN — GABAPENTIN 600 MG: 300 CAPSULE ORAL at 08:17

## 2018-11-16 RX ADMIN — QUETIAPINE FUMARATE 50 MG: 25 TABLET ORAL at 08:17

## 2018-11-16 RX ADMIN — ACETAMINOPHEN 650 MG: 325 TABLET ORAL at 23:05

## 2018-11-16 RX ADMIN — QUETIAPINE FUMARATE 200 MG: 200 TABLET ORAL at 21:35

## 2018-11-16 RX ADMIN — INSULIN LISPRO 2 UNITS: 100 INJECTION, SOLUTION INTRAVENOUS; SUBCUTANEOUS at 21:37

## 2018-11-16 RX ADMIN — ATORVASTATIN CALCIUM 40 MG: 40 TABLET, FILM COATED ORAL at 21:34

## 2018-11-16 RX ADMIN — INSULIN LISPRO 40 UNITS: 100 INJECTION, SOLUTION INTRAVENOUS; SUBCUTANEOUS at 12:19

## 2018-11-16 RX ADMIN — PRAZOSIN HYDROCHLORIDE 2 MG: 1 CAPSULE ORAL at 21:34

## 2018-11-16 RX ADMIN — ACETAMINOPHEN 650 MG: 325 TABLET ORAL at 05:03

## 2018-11-16 RX ADMIN — INSULIN GLARGINE 80 UNITS: 100 INJECTION, SOLUTION SUBCUTANEOUS at 21:43

## 2018-11-16 RX ADMIN — PANTOPRAZOLE SODIUM 40 MG: 40 TABLET, DELAYED RELEASE ORAL at 06:27

## 2018-11-16 RX ADMIN — GABAPENTIN 600 MG: 300 CAPSULE ORAL at 21:34

## 2018-11-16 RX ADMIN — INSULIN LISPRO 40 UNITS: 100 INJECTION, SOLUTION INTRAVENOUS; SUBCUTANEOUS at 17:22

## 2018-11-16 RX ADMIN — INSULIN LISPRO 5 UNITS: 100 INJECTION, SOLUTION INTRAVENOUS; SUBCUTANEOUS at 08:18

## 2018-11-16 RX ADMIN — BUSPIRONE HYDROCHLORIDE 10 MG: 10 TABLET ORAL at 14:10

## 2018-11-16 RX ADMIN — CETIRIZINE HYDROCHLORIDE 10 MG: 10 TABLET ORAL at 08:17

## 2018-11-16 RX ADMIN — INSULIN LISPRO 2 UNITS: 100 INJECTION, SOLUTION INTRAVENOUS; SUBCUTANEOUS at 12:20

## 2018-11-16 RX ADMIN — INSULIN LISPRO 2 UNITS: 100 INJECTION, SOLUTION INTRAVENOUS; SUBCUTANEOUS at 17:20

## 2018-11-16 ASSESSMENT — PAIN SCALES - GENERAL
PAINLEVEL_OUTOF10: 3
PAINLEVEL_OUTOF10: 0
PAINLEVEL_OUTOF10: 7
PAINLEVEL_OUTOF10: 8

## 2018-11-16 ASSESSMENT — PAIN DESCRIPTION - PAIN TYPE: TYPE: ACUTE PAIN

## 2018-11-16 ASSESSMENT — PAIN DESCRIPTION - DESCRIPTORS: DESCRIPTORS: HEADACHE

## 2018-11-16 ASSESSMENT — PAIN DESCRIPTION - FREQUENCY: FREQUENCY: CONTINUOUS

## 2018-11-16 ASSESSMENT — PAIN DESCRIPTION - ONSET: ONSET: SUDDEN

## 2018-11-16 ASSESSMENT — PAIN DESCRIPTION - LOCATION: LOCATION: HEAD

## 2018-11-16 NOTE — PLAN OF CARE
Problem: Altered Mood, Depressive Behavior:  Goal: Able to verbalize acceptance of life and situations over which he or she has no control  Able to verbalize acceptance of life and situations over which he or she has no control   Outcome: Ongoing                                                                      Group Therapy Note      Date: 11/15/2018  Start Time: 11:00 am   End Time:  11:45 am   Number of Participants: 10     Type of Group: Psychotherapy     Patient's Goal:  Pt will improve interpersonal interactions through group processing and agenda setting.      Notes:  Pt participated in group discussion, provided supportive feedback, and addressed her agenda within the group. Status After Intervention:  Unchanged    Participation Level:  Active Listener and Interactive    Participation Quality: Appropriate, Attentive and Sharing      Speech:  normal      Thought Process/Content: Logical  Linear      Affective Functioning: Congruent      Mood: euthymic      Level of consciousness:  Alert, Oriented x4 and Attentive      Response to Learning: Able to verbalize current knowledge/experience and Progressing to goal      Endings: None Reported    Modes of Intervention: Education, Support, Socialization, Exploration, Clarifying, Problem-solving, Activity and Movement      Discipline Responsible: /Counselor      Signature:  Nicole Shah, [facility]

## 2018-11-16 NOTE — PROGRESS NOTES
Psychiatric Provider Progress Note    Teodoro Worthington  [id number]    Hospital Day: 2    Active Problem List  Active Hospital Problems    Diagnosis Date Noted    HLD (hyperlipidemia) [E78.5] 11/15/2018    GERD (gastroesophageal reflux disease) [K21.9] 11/15/2018    Other insomnia [G47.09] 11/15/2018    Borderline personality disorder ([facility] [F60.3] 11/14/2018    Posttraumatic stress disorder [F43.10] 03/05/2015    DM2 (diabetes mellitus, type 2) ([facility] [E11.9] 08/29/2012       Reason for Admission  Context: Stress  Location: mood  Duration: 5 days. Severity on Admission: severe  Associated Symptoms on Admission: reports no sleep for 5 days, reported S/I in ED, nightmares, anxiety  Modifying Factors: staying with son's girlfriend's family, missing appointments    SUBJECTIVE/UPDATE:  Teodoro Worthington reports she slept better, slept 5.75. Reports no nightmares last night. Continues to report high anxiety and depressed mood but feels she is making progress. Open to trial of Buspar, has not tried in the past.  Discussed her diet, discussed reducing carbohydrate intake as she reports high carb intake as well as soda, which we also discussed. Review of Systems  Review of Systems   Psychiatric/Behavioral: Positive for dysphoric mood. Negative for sleep disturbance. The patient is nervous/anxious. All other systems reviewed and are negative.         Social History  Social History     Social History    Marital status:      Spouse name: N/A    Number of children: 2    Years of education: 12     Social History Main Topics    Smoking status: Never Smoker    Smokeless tobacco: Never Used    Alcohol use No    Drug use: No      Comment: hx IV heroin-clean 3 years    Sexual activity: Not Currently     Other Topics Concern    None     Social History Narrative    None       Scheduled Meds   busPIRone  10 mg Oral BID    insulin lispro  40 Units Subcutaneous TID WC    canagliflozin  100 mg Oral QAM AC   

## 2018-11-16 NOTE — BH NOTE
Patient resting quietly in bed with eyes closed. No farther c/o's voiced @ present. PRN Tylenol and vistaril was effective.

## 2018-11-17 LAB
GLUCOSE BLD-MCNC: 238 MG/DL (ref 70–99)
GLUCOSE BLD-MCNC: 248 MG/DL (ref 70–99)
GLUCOSE BLD-MCNC: 298 MG/DL (ref 70–99)
GLUCOSE BLD-MCNC: 317 MG/DL (ref 70–99)
GLUCOSE BLD-MCNC: 410 MG/DL (ref 70–99)
PERFORMED ON: ABNORMAL

## 2018-11-17 PROCEDURE — 6370000000 HC RX 637 (ALT 250 FOR IP): Performed by: PHYSICIAN ASSISTANT

## 2018-11-17 PROCEDURE — 6370000000 HC RX 637 (ALT 250 FOR IP): Performed by: NURSE PRACTITIONER

## 2018-11-17 PROCEDURE — 99233 SBSQ HOSP IP/OBS HIGH 50: CPT | Performed by: NURSE PRACTITIONER

## 2018-11-17 PROCEDURE — 1240000000 HC EMOTIONAL WELLNESS R&B

## 2018-11-17 RX ORDER — PANTOPRAZOLE SODIUM 40 MG/1
40 TABLET, DELAYED RELEASE ORAL
Status: DISCONTINUED | OUTPATIENT
Start: 2018-11-17 | End: 2018-11-19 | Stop reason: HOSPADM

## 2018-11-17 RX ORDER — TIZANIDINE 4 MG/1
2 TABLET ORAL EVERY 6 HOURS PRN
Status: DISCONTINUED | OUTPATIENT
Start: 2018-11-17 | End: 2018-11-19 | Stop reason: HOSPADM

## 2018-11-17 RX ADMIN — PANTOPRAZOLE SODIUM 40 MG: 40 TABLET, DELAYED RELEASE ORAL at 17:33

## 2018-11-17 RX ADMIN — BUSPIRONE HYDROCHLORIDE 10 MG: 10 TABLET ORAL at 19:52

## 2018-11-17 RX ADMIN — GABAPENTIN 600 MG: 300 CAPSULE ORAL at 08:49

## 2018-11-17 RX ADMIN — INSULIN LISPRO 12 UNITS: 100 INJECTION, SOLUTION INTRAVENOUS; SUBCUTANEOUS at 08:30

## 2018-11-17 RX ADMIN — PRAZOSIN HYDROCHLORIDE 2 MG: 1 CAPSULE ORAL at 19:52

## 2018-11-17 RX ADMIN — ATORVASTATIN CALCIUM 40 MG: 40 TABLET, FILM COATED ORAL at 19:52

## 2018-11-17 RX ADMIN — INSULIN LISPRO 40 UNITS: 100 INJECTION, SOLUTION INTRAVENOUS; SUBCUTANEOUS at 08:27

## 2018-11-17 RX ADMIN — DOXEPIN HYDROCHLORIDE 150 MG: 100 CAPSULE ORAL at 19:52

## 2018-11-17 RX ADMIN — INSULIN LISPRO 40 UNITS: 100 INJECTION, SOLUTION INTRAVENOUS; SUBCUTANEOUS at 17:28

## 2018-11-17 RX ADMIN — BUSPIRONE HYDROCHLORIDE 10 MG: 10 TABLET ORAL at 08:50

## 2018-11-17 RX ADMIN — ACETAMINOPHEN 650 MG: 325 TABLET ORAL at 20:02

## 2018-11-17 RX ADMIN — INSULIN LISPRO 4 UNITS: 100 INJECTION, SOLUTION INTRAVENOUS; SUBCUTANEOUS at 12:19

## 2018-11-17 RX ADMIN — INSULIN LISPRO 40 UNITS: 100 INJECTION, SOLUTION INTRAVENOUS; SUBCUTANEOUS at 12:18

## 2018-11-17 RX ADMIN — ACETAMINOPHEN 650 MG: 325 TABLET ORAL at 14:26

## 2018-11-17 RX ADMIN — INSULIN LISPRO 3 UNITS: 100 INJECTION, SOLUTION INTRAVENOUS; SUBCUTANEOUS at 20:00

## 2018-11-17 RX ADMIN — INSULIN LISPRO 4 UNITS: 100 INJECTION, SOLUTION INTRAVENOUS; SUBCUTANEOUS at 17:29

## 2018-11-17 RX ADMIN — GABAPENTIN 600 MG: 300 CAPSULE ORAL at 14:50

## 2018-11-17 RX ADMIN — CETIRIZINE HYDROCHLORIDE 10 MG: 10 TABLET ORAL at 08:50

## 2018-11-17 RX ADMIN — GABAPENTIN 600 MG: 300 CAPSULE ORAL at 19:52

## 2018-11-17 RX ADMIN — PANTOPRAZOLE SODIUM 40 MG: 40 TABLET, DELAYED RELEASE ORAL at 06:20

## 2018-11-17 RX ADMIN — INSULIN GLARGINE 80 UNITS: 100 INJECTION, SOLUTION SUBCUTANEOUS at 20:00

## 2018-11-17 RX ADMIN — TIZANIDINE 2 MG: 4 TABLET ORAL at 20:03

## 2018-11-17 RX ADMIN — QUETIAPINE FUMARATE 300 MG: 200 TABLET ORAL at 19:52

## 2018-11-17 RX ADMIN — QUETIAPINE FUMARATE 50 MG: 25 TABLET ORAL at 08:49

## 2018-11-17 RX ADMIN — TIZANIDINE 2 MG: 4 TABLET ORAL at 14:25

## 2018-11-17 ASSESSMENT — PAIN SCALES - GENERAL
PAINLEVEL_OUTOF10: 8
PAINLEVEL_OUTOF10: 8

## 2018-11-17 NOTE — PLAN OF CARE
Problem: Altered Mood, Depressive Behavior:  Goal: Able to verbalize and/or display a decrease in depressive symptoms  Able to verbalize and/or display a decrease in depressive symptoms   Outcome: Ongoing                                                                      Group Therapy Note    Date: 11/17/2018  Start Time: 1:00pm  End Time: 2:30pm  Number of Participants: 6    Type of Group: Cognitive Skills    Recreation Therapy    Patient's Goal: To practice mindfulness, while engaging in finger painting and listening to music. Notes: Pt engaged in group activity. Pt provided feedback on what he could hear, taste, touch, smell, and feel throughout group session. Group discussed the benefits of mindfulness.      Status After Intervention:  Improved    Participation Level: Minimal    Participation Quality: Appropriate and Attentive      Speech:  normal      Thought Process/Content: Linear      Affective Functioning: Flat      Mood: depressed      Level of consciousness:  Alert and Oriented x4      Response to Learning: Able to retain information and Capable of insight      Endings: None Reported    Modes of Intervention: Education, Support, Socialization and Activity      Discipline Responsible: Psychoeducational Specialist      Signature:  Michael Garcia

## 2018-11-17 NOTE — PROGRESS NOTES
Nightly    QUEtiapine  50 mg Oral Daily    QUEtiapine  200 mg Oral Nightly    prazosin  2 mg Oral Nightly    cetirizine  10 mg Oral Daily    pantoprazole  40 mg Oral QAM AC    doxepin  150 mg Oral Nightly    atorvastatin  40 mg Oral Nightly    gabapentin  600 mg Oral TID       PRN Meds  tiZANidine, acetaminophen, hydrOXYzine, magnesium hydroxide, aluminum & magnesium hydroxide-simethicone, glucose, glucagon (rDNA), medicated lip balm    OBJECTIVE  Labs:  Recent Results (from the past 24 hour(s))   POCT Glucose    Collection Time: 11/16/18  5:06 PM   Result Value Ref Range    POC Glucose 210 (H) 70 - 99 mg/dl    Performed on ACCU-CHEK    POCT Glucose    Collection Time: 11/16/18  9:04 PM   Result Value Ref Range    POC Glucose 267 (H) 70 - 99 mg/dl    Performed on ACCU-CHEK    POCT Glucose    Collection Time: 11/17/18  3:50 AM   Result Value Ref Range    POC Glucose 317 (H) 70 - 99 mg/dl    Performed on ACCU-CHEK    POCT Glucose    Collection Time: 11/17/18  6:23 AM   Result Value Ref Range    POC Glucose 410 (H) 70 - 99 mg/dl    Performed on ACCU-CHEK    POCT Glucose    Collection Time: 11/17/18 11:38 AM   Result Value Ref Range    POC Glucose 248 (H) 70 - 99 mg/dl    Performed on ACCU-CHEK        Physical Assessment:  Constitutional: No acute distress noted  HEENT: Atraumatic  Cardiovascular: VSS  Respiratory: no distress noted  Neurological: No cranial nerve abnormalities apparent  MS: No abnormalities apparent  Gait: WNL  Psychiatric: Please see below    Vital Signs:  Vitals:    11/15/18 1942 11/16/18 0824 11/16/18 2130 11/17/18 0815   BP: 121/66 131/88 114/73 129/74   Pulse: 121 96 96 90   Resp: 16 16 16   Temp: 98.7 °F (37.1 °C) 98.4 °F (36.9 °C) 97.6 °F (36.4 °C) 98 °F (36.7 °C)   TempSrc: Oral Oral Oral Oral   SpO2:       Weight:       Height:             PSYCHIATRIC ASSESSMENT   Sleep: [x] 0-3  [] 4-6 [] 7-9   [] 10+  Appetite adequate:  [x] Yes  [] No  Attending to hygiene: [x] Yes  [] No   EPS: [] Yes  [x] No  Attending groups: [x] Yes  [] No  Patient reports side effects from psychiatric medications: [x] No  [] Yes:    Patient on more than 1 Antipsychotic: [x] No [] Yes:    PSYCHIATRIC EXAMINATION / MENTAL STATUS EXAM:      General appearance: [x] WNL [] disheveled [] poor hygiene []             Relatedness: [x] cooperative [] guarded [] indifferent [] hostile [] sedated     Speech:  [x] WNL [] pressured [] decreased volume [] increased volume [] delayed      Kinetics: [x] normal [] increased [] decreased     Mood: [x] reports \"depressed\" [x] normal range [] irritable [] labile [] euphoric [] decreased range     Affect: [x] normal range [] blunted [] flat [] labile [] mood incongruent      Thought Process: [x] logical [] tangential [] delayed/TB [] disorganized []FOI [] concrete      Thought Content: [x] future oriented [] delusions [] guilt [] paranoid                               [] hopelessness  [] obsessive [] superficial      Delusions: [x] none [] grandiose [] paranoid  [] persecutory  [] somatic         [] Buddhism/spiritual     Hallucination Reported: [x] None [] auditory  [] visual  [] olfactory [] tactile  Observed RTIS:         [x] None [] Appears to be distracted by internal stimuli     Suicidal ideation: [x] denies [] endorses [] plan [] intent [] access  Homicidal ideation: [x] denies [] endorses [] plan [] intent [] access     Insight: [] good [] fair [x] poor  [] none  Judgment: [] good [] fair [x] poor  [] impulsive      Attention and concentration: [x] intact [] limited [] impaired     Orientation: [x] person, place, time, situation [] disoriented to:         Memory: Remote [x]intact [] impaired                  Recent  [x]intact [] impaired     Diagnoses  1. Borderline Personality Disorder   2. IDDM   3. Insomnia   4. PTSD   5.            Plan   Reviewed nursing and ancillary staff notes from last 24 hours. Evaluated medications and assessed for side effects and effectiveness.

## 2018-11-18 LAB
GLUCOSE BLD-MCNC: 219 MG/DL (ref 70–99)
GLUCOSE BLD-MCNC: 228 MG/DL (ref 70–99)
GLUCOSE BLD-MCNC: 295 MG/DL (ref 70–99)
GLUCOSE BLD-MCNC: 377 MG/DL (ref 70–99)
GLUCOSE BLD-MCNC: 421 MG/DL (ref 70–99)
PERFORMED ON: ABNORMAL

## 2018-11-18 PROCEDURE — 6370000000 HC RX 637 (ALT 250 FOR IP): Performed by: PHYSICIAN ASSISTANT

## 2018-11-18 PROCEDURE — 1240000000 HC EMOTIONAL WELLNESS R&B

## 2018-11-18 PROCEDURE — 6370000000 HC RX 637 (ALT 250 FOR IP): Performed by: NURSE PRACTITIONER

## 2018-11-18 RX ADMIN — PANTOPRAZOLE SODIUM 40 MG: 40 TABLET, DELAYED RELEASE ORAL at 15:22

## 2018-11-18 RX ADMIN — TIZANIDINE 2 MG: 4 TABLET ORAL at 21:18

## 2018-11-18 RX ADMIN — TIZANIDINE 2 MG: 4 TABLET ORAL at 13:11

## 2018-11-18 RX ADMIN — PANTOPRAZOLE SODIUM 40 MG: 40 TABLET, DELAYED RELEASE ORAL at 06:18

## 2018-11-18 RX ADMIN — INSULIN LISPRO 5 UNITS: 100 INJECTION, SOLUTION INTRAVENOUS; SUBCUTANEOUS at 21:24

## 2018-11-18 RX ADMIN — INSULIN LISPRO 6 UNITS: 100 INJECTION, SOLUTION INTRAVENOUS; SUBCUTANEOUS at 17:36

## 2018-11-18 RX ADMIN — ACETAMINOPHEN 650 MG: 325 TABLET ORAL at 04:10

## 2018-11-18 RX ADMIN — INSULIN GLARGINE 80 UNITS: 100 INJECTION, SOLUTION SUBCUTANEOUS at 21:25

## 2018-11-18 RX ADMIN — GABAPENTIN 600 MG: 300 CAPSULE ORAL at 08:26

## 2018-11-18 RX ADMIN — BUSPIRONE HYDROCHLORIDE 10 MG: 10 TABLET ORAL at 21:17

## 2018-11-18 RX ADMIN — QUETIAPINE FUMARATE 50 MG: 25 TABLET ORAL at 08:26

## 2018-11-18 RX ADMIN — INSULIN LISPRO 6 UNITS: 100 INJECTION, SOLUTION INTRAVENOUS; SUBCUTANEOUS at 12:24

## 2018-11-18 RX ADMIN — DOXEPIN HYDROCHLORIDE 150 MG: 100 CAPSULE ORAL at 21:18

## 2018-11-18 RX ADMIN — QUETIAPINE FUMARATE 300 MG: 200 TABLET ORAL at 21:18

## 2018-11-18 RX ADMIN — GABAPENTIN 600 MG: 300 CAPSULE ORAL at 21:18

## 2018-11-18 RX ADMIN — BUSPIRONE HYDROCHLORIDE 10 MG: 10 TABLET ORAL at 08:26

## 2018-11-18 RX ADMIN — ATORVASTATIN CALCIUM 40 MG: 40 TABLET, FILM COATED ORAL at 21:18

## 2018-11-18 RX ADMIN — CETIRIZINE HYDROCHLORIDE 10 MG: 10 TABLET ORAL at 08:26

## 2018-11-18 RX ADMIN — ACETAMINOPHEN 650 MG: 325 TABLET ORAL at 21:17

## 2018-11-18 RX ADMIN — TIZANIDINE 2 MG: 4 TABLET ORAL at 04:11

## 2018-11-18 RX ADMIN — ACETAMINOPHEN 650 MG: 325 TABLET ORAL at 13:11

## 2018-11-18 RX ADMIN — INSULIN LISPRO 40 UNITS: 100 INJECTION, SOLUTION INTRAVENOUS; SUBCUTANEOUS at 17:35

## 2018-11-18 RX ADMIN — PRAZOSIN HYDROCHLORIDE 2 MG: 1 CAPSULE ORAL at 21:18

## 2018-11-18 RX ADMIN — INSULIN LISPRO 40 UNITS: 100 INJECTION, SOLUTION INTRAVENOUS; SUBCUTANEOUS at 12:23

## 2018-11-18 RX ADMIN — INSULIN LISPRO 15 UNITS: 100 INJECTION, SOLUTION INTRAVENOUS; SUBCUTANEOUS at 08:22

## 2018-11-18 RX ADMIN — INSULIN LISPRO 40 UNITS: 100 INJECTION, SOLUTION INTRAVENOUS; SUBCUTANEOUS at 08:20

## 2018-11-18 RX ADMIN — HYDROXYZINE PAMOATE 50 MG: 50 CAPSULE ORAL at 16:44

## 2018-11-18 RX ADMIN — GABAPENTIN 600 MG: 300 CAPSULE ORAL at 15:21

## 2018-11-18 ASSESSMENT — PAIN SCALES - GENERAL
PAINLEVEL_OUTOF10: 9
PAINLEVEL_OUTOF10: 3
PAINLEVEL_OUTOF10: 8

## 2018-11-18 NOTE — BH NOTE
Writer invited pt to attend group, but pt declined to attend. She stated that she would prefer to read.     Wagner Dhillon

## 2018-11-19 VITALS
SYSTOLIC BLOOD PRESSURE: 108 MMHG | RESPIRATION RATE: 14 BRPM | WEIGHT: 213 LBS | HEIGHT: 63 IN | OXYGEN SATURATION: 96 % | HEART RATE: 88 BPM | TEMPERATURE: 96.9 F | BODY MASS INDEX: 37.74 KG/M2 | DIASTOLIC BLOOD PRESSURE: 73 MMHG

## 2018-11-19 LAB
GLUCOSE BLD-MCNC: 311 MG/DL (ref 70–99)
PERFORMED ON: ABNORMAL

## 2018-11-19 PROCEDURE — 99239 HOSP IP/OBS DSCHRG MGMT >30: CPT | Performed by: NURSE PRACTITIONER

## 2018-11-19 PROCEDURE — 6370000000 HC RX 637 (ALT 250 FOR IP): Performed by: PHYSICIAN ASSISTANT

## 2018-11-19 PROCEDURE — 5130000000 HC BRIDGE APPOINTMENT

## 2018-11-19 PROCEDURE — 6370000000 HC RX 637 (ALT 250 FOR IP): Performed by: NURSE PRACTITIONER

## 2018-11-19 RX ORDER — QUETIAPINE FUMARATE 50 MG/1
50 TABLET, FILM COATED ORAL EVERY MORNING
Qty: 30 TABLET | Refills: 0 | Status: SHIPPED | OUTPATIENT
Start: 2018-11-19

## 2018-11-19 RX ORDER — BUSPIRONE HYDROCHLORIDE 10 MG/1
10 TABLET ORAL 2 TIMES DAILY
Qty: 60 TABLET | Refills: 0 | Status: SHIPPED | OUTPATIENT
Start: 2018-11-19

## 2018-11-19 RX ORDER — QUETIAPINE FUMARATE 300 MG/1
300 TABLET, FILM COATED ORAL NIGHTLY
Qty: 30 TABLET | Refills: 0 | Status: SHIPPED | OUTPATIENT
Start: 2018-11-19

## 2018-11-19 RX ORDER — PRAZOSIN HYDROCHLORIDE 2 MG/1
2 CAPSULE ORAL NIGHTLY
Qty: 30 CAPSULE | Refills: 0 | Status: SHIPPED | OUTPATIENT
Start: 2018-11-19

## 2018-11-19 RX ORDER — DOXEPIN HYDROCHLORIDE 150 MG/1
150 CAPSULE ORAL NIGHTLY
Qty: 30 CAPSULE | Refills: 0 | Status: SHIPPED | OUTPATIENT
Start: 2018-11-19

## 2018-11-19 RX ADMIN — BUSPIRONE HYDROCHLORIDE 10 MG: 10 TABLET ORAL at 08:37

## 2018-11-19 RX ADMIN — TIZANIDINE 2 MG: 4 TABLET ORAL at 04:39

## 2018-11-19 RX ADMIN — HYDROXYZINE PAMOATE 50 MG: 50 CAPSULE ORAL at 04:39

## 2018-11-19 RX ADMIN — INSULIN LISPRO 40 UNITS: 100 INJECTION, SOLUTION INTRAVENOUS; SUBCUTANEOUS at 08:32

## 2018-11-19 RX ADMIN — INSULIN LISPRO 12 UNITS: 100 INJECTION, SOLUTION INTRAVENOUS; SUBCUTANEOUS at 08:33

## 2018-11-19 RX ADMIN — GABAPENTIN 600 MG: 300 CAPSULE ORAL at 08:37

## 2018-11-19 RX ADMIN — ACETAMINOPHEN 650 MG: 325 TABLET ORAL at 04:39

## 2018-11-19 RX ADMIN — CETIRIZINE HYDROCHLORIDE 10 MG: 10 TABLET ORAL at 08:37

## 2018-11-19 RX ADMIN — QUETIAPINE FUMARATE 50 MG: 25 TABLET ORAL at 08:36

## 2018-11-19 RX ADMIN — PANTOPRAZOLE SODIUM 40 MG: 40 TABLET, DELAYED RELEASE ORAL at 06:04

## 2018-11-19 ASSESSMENT — PAIN SCALES - GENERAL
PAINLEVEL_OUTOF10: 0
PAINLEVEL_OUTOF10: 3
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
## 2018-11-19 NOTE — BH NOTE
Bridge Appointment completed: Reviewed Discharge Instructions with patient. Patient verbalizes understanding and agreement with the discharge plan using the teachback method.      Referral for Outpatient Tobacco Cessation Counseling, upon discharge (bekah X if applicable and completed):    ( )  Hospital staff assisted patient to call Quit Line or faxed referral                                   during hospitalization                  ( )  Recognizing danger situations (included triggers and roadblocks), if not completed on admission                    ( )  Coping skills (new ways to manage stress, exercise, relaxation techniques, changing routine, distraction), if not completed on admission                                                           (x)  Basic information about quitting (benefits of quitting, techniques in how to quit, available resources, if not completed on admission  ( ) Referral for counseling faxed to [facility]   ( ) Patient refused referral  ( ) Patient refused counseling  (x) Patient refused smoking cessation medication upon discharge    Vaccinations (bekah X if applicable and completed):  ( ) Patient states already received influenza vaccine elsewhere  ( ) Patient received influenza vaccine during this hospitalization  (x) Patient refused influenza vaccine at this time

## 2018-11-19 NOTE — BH NOTE
[facility]  Discharge Note    Pt discharged with followings belongings:   Dentures: None  Vision - Corrective Lenses: None  Hearing Aid: None  Jewelry: Ring, Earrings, Necklace, Bracelet  Body Piercings Removed: N/A  Clothing: Pants, Shirt, Undergarments (Comment)  Were All Patient Medications Collected?: Yes  Other Valuables: Ellyn Scruggs (credit cards and phone  )   Valuables sent home with patient. Valuables retrieved from safe, Security envelope number:  NA and returned to patient. Patient left department with Departure Mode: With caregiver via Mobility at Departure: Ambulatory, discharged to Discharged to: Private Residence. Patient education on aftercare instructions: yes  Information faxed to NA by NA Patient verbalize understanding of AVS:  yes.     Status EXAM upon discharge:  Status and Exam  Normal: Yes  Facial Expression: Brightened  Affect: Appropriate  Level of Consciousness: Alert  Mood:Normal: Yes  Mood:  (\"good, happy, not depressed, no SI\")  Motor Activity:Normal: Yes  Motor Activity: Other(See Comments) (normal, sitting)  Interview Behavior: Cooperative  Preception: [place] to Person, Celestino Renetta to Time, [place] to Place, [place] to Situation  Attention:Normal: Yes  Attention: Others(See comment) (able to concentrate)  Thought Processes: Circumstantial  Thought Content:Normal: Yes  Thought Content: Preoccupations  Hallucinations: None  Delusions: No  Memory:Normal: Yes  Insight and Judgment: Yes  Insight and Judgment: Other(See comment) (good)  Present Suicidal Ideation: No  Present Homicidal Ideation: No    Darylene Spire Crouthers, RN

## 2019-01-20 ENCOUNTER — APPOINTMENT (OUTPATIENT)
Dept: CT IMAGING | Age: 44
End: 2019-01-20
Payer: COMMERCIAL

## 2019-01-20 ENCOUNTER — HOSPITAL ENCOUNTER (EMERGENCY)
Age: 44
Discharge: HOME | End: 2019-01-20
Attending: EMERGENCY MEDICINE
Payer: COMMERCIAL

## 2019-01-20 VITALS
WEIGHT: 213 LBS | TEMPERATURE: 98.7 F | SYSTOLIC BLOOD PRESSURE: 129 MMHG | DIASTOLIC BLOOD PRESSURE: 78 MMHG | HEART RATE: 83 BPM | BODY MASS INDEX: 37.74 KG/M2 | RESPIRATION RATE: 16 BRPM | HEIGHT: 63 IN | OXYGEN SATURATION: 98 %

## 2019-01-20 DIAGNOSIS — R31.0 GROSS HEMATURIA: ICD-10-CM

## 2019-01-20 DIAGNOSIS — R73.9 HYPERGLYCEMIA, UNSPECIFIED: ICD-10-CM

## 2019-01-20 DIAGNOSIS — R10.9 LEFT FLANK PAIN: Primary | ICD-10-CM

## 2019-01-20 DIAGNOSIS — R03.0 ELEVATED BLOOD PRESSURE READING: ICD-10-CM

## 2019-01-20 DIAGNOSIS — R11.2 NAUSEA AND VOMITING, INTRACTABILITY OF VOMITING NOT SPECIFIED, UNSPECIFIED VOMITING TYPE: ICD-10-CM

## 2019-01-20 LAB
A/G RATIO: 1 (ref 1.1–2.2)
ALBUMIN SERPL-MCNC: 4.3 G/DL (ref 3.4–5)
ALP BLD-CCNC: 165 U/L (ref 40–129)
ALT SERPL-CCNC: 21 U/L (ref 10–40)
ANION GAP SERPL CALCULATED.3IONS-SCNC: 15 MMOL/L (ref 3–16)
AST SERPL-CCNC: 15 U/L (ref 15–37)
BASOPHILS ABSOLUTE: 0 K/UL (ref 0–0.2)
BASOPHILS RELATIVE PERCENT: 0.4 %
BILIRUB SERPL-MCNC: 0.4 MG/DL (ref 0–1)
BILIRUBIN URINE: NEGATIVE
BLOOD, URINE: ABNORMAL
BUN BLDV-MCNC: 9 MG/DL (ref 7–20)
CALCIUM SERPL-MCNC: 10 MG/DL (ref 8.3–10.6)
CHLORIDE BLD-SCNC: 95 MMOL/L (ref 99–110)
CLARITY: ABNORMAL
CO2: 24 MMOL/L (ref 21–32)
COLOR: YELLOW
CREAT SERPL-MCNC: <0.5 MG/DL (ref 0.6–1.1)
EOSINOPHILS ABSOLUTE: 0.3 K/UL (ref 0–0.6)
EOSINOPHILS RELATIVE PERCENT: 3.9 %
EPITHELIAL CELLS, UA: ABNORMAL /HPF
GFR AFRICAN AMERICAN: >60
GFR NON-AFRICAN AMERICAN: >60
GLOBULIN: 4.5 G/DL
GLUCOSE BLD-MCNC: 441 MG/DL (ref 70–99)
GLUCOSE URINE: >=1000 MG/DL
HCT VFR BLD CALC: 44.1 % (ref 36–48)
HEMOGLOBIN: 15.1 G/DL (ref 12–16)
KETONES, URINE: NEGATIVE MG/DL
LEUKOCYTE ESTERASE, URINE: NEGATIVE
LYMPHOCYTES ABSOLUTE: 3 K/UL (ref 1–5.1)
LYMPHOCYTES RELATIVE PERCENT: 34.2 %
MCH RBC QN AUTO: 31.7 PG (ref 26–34)
MCHC RBC AUTO-ENTMCNC: 34.3 G/DL (ref 31–36)
MCV RBC AUTO: 92.4 FL (ref 80–100)
MICROSCOPIC EXAMINATION: YES
MONOCYTES ABSOLUTE: 0.5 K/UL (ref 0–1.3)
MONOCYTES RELATIVE PERCENT: 6.2 %
NEUTROPHILS ABSOLUTE: 4.8 K/UL (ref 1.7–7.7)
NEUTROPHILS RELATIVE PERCENT: 55.3 %
NITRITE, URINE: NEGATIVE
PDW BLD-RTO: 14 % (ref 12.4–15.4)
PH UA: 6.5
PLATELET # BLD: 204 K/UL (ref 135–450)
PMV BLD AUTO: 8.3 FL (ref 5–10.5)
POTASSIUM REFLEX MAGNESIUM: 3.7 MMOL/L (ref 3.5–5.1)
PROTEIN UA: 100 MG/DL
RBC # BLD: 4.77 M/UL (ref 4–5.2)
RBC UA: ABNORMAL /HPF (ref 0–2)
SODIUM BLD-SCNC: 134 MMOL/L (ref 136–145)
SPECIFIC GRAVITY UA: 1.01
TOTAL PROTEIN: 8.8 G/DL (ref 6.4–8.2)
URINE REFLEX TO CULTURE: ABNORMAL
URINE TYPE: ABNORMAL
UROBILINOGEN, URINE: 0.2 E.U./DL
WBC # BLD: 8.7 K/UL (ref 4–11)
WBC UA: ABNORMAL /HPF (ref 0–5)

## 2019-01-20 PROCEDURE — 85025 COMPLETE CBC W/AUTO DIFF WBC: CPT

## 2019-01-20 PROCEDURE — 81001 URINALYSIS AUTO W/SCOPE: CPT

## 2019-01-20 PROCEDURE — 99284 EMERGENCY DEPT VISIT MOD MDM: CPT

## 2019-01-20 PROCEDURE — 96375 TX/PRO/DX INJ NEW DRUG ADDON: CPT

## 2019-01-20 PROCEDURE — 74176 CT ABD & PELVIS W/O CONTRAST: CPT

## 2019-01-20 PROCEDURE — 96374 THER/PROPH/DIAG INJ IV PUSH: CPT

## 2019-01-20 PROCEDURE — 80053 COMPREHEN METABOLIC PANEL: CPT

## 2019-01-20 PROCEDURE — 6360000002 HC RX W HCPCS: Performed by: EMERGENCY MEDICINE

## 2019-01-20 RX ORDER — FENTANYL CITRATE 50 UG/ML
50 INJECTION, SOLUTION INTRAMUSCULAR; INTRAVENOUS
Status: DISCONTINUED | OUTPATIENT
Start: 2019-01-20 | End: 2019-01-20 | Stop reason: HOSPADM

## 2019-01-20 RX ORDER — ONDANSETRON 4 MG/1
4 TABLET, ORALLY DISINTEGRATING ORAL EVERY 8 HOURS PRN
Qty: 10 TABLET | Refills: 0 | Status: SHIPPED | OUTPATIENT
Start: 2019-01-20

## 2019-01-20 RX ORDER — 0.9 % SODIUM CHLORIDE 0.9 %
500 INTRAVENOUS SOLUTION INTRAVENOUS ONCE
Status: DISCONTINUED | OUTPATIENT
Start: 2019-01-20 | End: 2019-01-20 | Stop reason: HOSPADM

## 2019-01-20 RX ORDER — OXYCODONE HYDROCHLORIDE AND ACETAMINOPHEN 5; 325 MG/1; MG/1
1 TABLET ORAL EVERY 6 HOURS PRN
Qty: 3 TABLET | Refills: 0 | Status: SHIPPED | OUTPATIENT
Start: 2019-01-20 | End: 2019-01-27

## 2019-01-20 RX ORDER — ONDANSETRON 2 MG/ML
4 INJECTION INTRAMUSCULAR; INTRAVENOUS ONCE
Status: COMPLETED | OUTPATIENT
Start: 2019-01-20 | End: 2019-01-20

## 2019-01-20 RX ADMIN — FENTANYL CITRATE 50 MCG: 50 INJECTION, SOLUTION INTRAMUSCULAR; INTRAVENOUS at 04:07

## 2019-01-20 RX ADMIN — ONDANSETRON 4 MG: 2 INJECTION INTRAMUSCULAR; INTRAVENOUS at 04:07

## 2019-01-20 ASSESSMENT — PAIN DESCRIPTION - PAIN TYPE: TYPE: ACUTE PAIN

## 2019-01-20 ASSESSMENT — PAIN DESCRIPTION - LOCATION: LOCATION: FLANK

## 2019-01-20 ASSESSMENT — PAIN SCALES - GENERAL
PAINLEVEL_OUTOF10: 10
PAINLEVEL_OUTOF10: 10

## 2019-01-20 ASSESSMENT — PAIN DESCRIPTION - ORIENTATION: ORIENTATION: LEFT

## 2021-02-25 ENCOUNTER — HOSPITAL ENCOUNTER (EMERGENCY)
Age: 46
Discharge: HOME | End: 2021-02-25
Payer: COMMERCIAL

## 2021-02-25 VITALS
SYSTOLIC BLOOD PRESSURE: 122 MMHG | DIASTOLIC BLOOD PRESSURE: 84 MMHG | HEART RATE: 98 BPM | RESPIRATION RATE: 16 BRPM | HEIGHT: 63 IN | OXYGEN SATURATION: 97 % | TEMPERATURE: 96.9 F | WEIGHT: 168.19 LBS | BODY MASS INDEX: 29.8 KG/M2

## 2021-02-25 DIAGNOSIS — L02.01 FACIAL ABSCESS: Primary | ICD-10-CM

## 2021-02-25 PROCEDURE — 6370000000 HC RX 637 (ALT 250 FOR IP): Performed by: PHYSICIAN ASSISTANT

## 2021-02-25 PROCEDURE — 99283 EMERGENCY DEPT VISIT LOW MDM: CPT

## 2021-02-25 RX ORDER — SULFAMETHOXAZOLE AND TRIMETHOPRIM 800; 160 MG/1; MG/1
1 TABLET ORAL ONCE
Status: COMPLETED | OUTPATIENT
Start: 2021-02-25 | End: 2021-02-25

## 2021-02-25 RX ORDER — ONDANSETRON 4 MG/1
8 TABLET, ORALLY DISINTEGRATING ORAL ONCE
Status: COMPLETED | OUTPATIENT
Start: 2021-02-25 | End: 2021-02-25

## 2021-02-25 RX ORDER — ACETAMINOPHEN 325 MG/1
650 TABLET ORAL EVERY 6 HOURS PRN
COMMUNITY

## 2021-02-25 RX ORDER — SULFAMETHOXAZOLE AND TRIMETHOPRIM 800; 160 MG/1; MG/1
1 TABLET ORAL 2 TIMES DAILY
Qty: 20 TABLET | Refills: 0 | Status: SHIPPED | OUTPATIENT
Start: 2021-02-25 | End: 2021-03-07

## 2021-02-25 RX ORDER — TIZANIDINE 4 MG/1
4 TABLET ORAL EVERY 6 HOURS PRN
COMMUNITY

## 2021-02-25 RX ORDER — CEPHALEXIN 250 MG/1
500 CAPSULE ORAL ONCE
Status: COMPLETED | OUTPATIENT
Start: 2021-02-25 | End: 2021-02-25

## 2021-02-25 RX ORDER — CEPHALEXIN 500 MG/1
500 CAPSULE ORAL 4 TIMES DAILY
Qty: 40 CAPSULE | Refills: 0 | Status: SHIPPED | OUTPATIENT
Start: 2021-02-25 | End: 2021-03-07

## 2021-02-25 RX ADMIN — SULFAMETHOXAZOLE AND TRIMETHOPRIM 1 TABLET: 800; 160 TABLET ORAL at 17:09

## 2021-02-25 RX ADMIN — CEPHALEXIN 500 MG: 250 CAPSULE ORAL at 17:09

## 2021-02-25 RX ADMIN — ONDANSETRON 8 MG: 4 TABLET, ORALLY DISINTEGRATING ORAL at 17:23

## 2021-02-25 ASSESSMENT — ENCOUNTER SYMPTOMS
COUGH: 0
WHEEZING: 0
FACIAL SWELLING: 1
VOMITING: 0
NAUSEA: 0
DIARRHEA: 0
SHORTNESS OF BREATH: 0
RHINORRHEA: 0
ABDOMINAL PAIN: 0

## 2021-02-25 ASSESSMENT — PAIN DESCRIPTION - ORIENTATION: ORIENTATION: LOWER

## 2021-02-25 ASSESSMENT — PAIN DESCRIPTION - FREQUENCY: FREQUENCY: CONTINUOUS

## 2021-02-25 ASSESSMENT — PAIN DESCRIPTION - LOCATION: LOCATION: MOUTH

## 2021-02-25 ASSESSMENT — PAIN DESCRIPTION - DESCRIPTORS: DESCRIPTORS: THROBBING

## 2021-02-25 ASSESSMENT — PAIN DESCRIPTION - PAIN TYPE: TYPE: ACUTE PAIN

## 2021-02-25 NOTE — ED PROVIDER NOTES
[facility]        Pt Name: Erich Manning  MRN: [id number]  [place] 1975  Date of evaluation: 2/25/2021  Provider: Alex Ochoa PA-C  PCP: Anila Moura MD    DIANA. I have evaluated this patient. My supervising physician was available for consultation. CHIEF COMPLAINT       Chief Complaint   Patient presents with    Abscess     pt with swollen bottom lip, states she has had them there before. Also just had one on her buttocks back in January. HISTORY OF PRESENT ILLNESS   (Location, Timing/Onset, Context/Setting, Quality, Duration, Modifying Factors, Severity, Associated Signs and Symptoms)  Note limiting factors. Erich Manning is a 50 y.o. female with history of bipolar disorder, borderline personality disorder, hepatitis C, PTSD and IVDA, presents for evaluation of abscess to her chin x4 days with associated swelling into her lower lip. She states the area is draining. She denies any known fevers or chills. No abdominal pain, she is complaining of some nausea. No vomiting or diarrhea. States that she does have history of similar lesions requiring incision and drainage. She has no other complaints or concerns at this time. Nursing Notes were all reviewed and agreed with or any disagreements were addressed in the HPI. REVIEW OF SYSTEMS    (2-9 systems for level 4, 10 or more for level 5)     Review of Systems   Constitutional: Negative for appetite change, chills and fever. HENT: Positive for facial swelling. Negative for congestion and rhinorrhea. Respiratory: Negative for cough, shortness of breath and wheezing. Cardiovascular: Negative for chest pain. Gastrointestinal: Negative for abdominal pain, diarrhea, nausea and vomiting. Genitourinary: Negative for difficulty urinating, dysuria and hematuria. Musculoskeletal: Negative for neck pain and neck stiffness. Skin: Positive for wound. Inject 40 Units into the skin 3 times daily (before meals)     INSULIN GLARGINE (BASAGLAR KWIKPEN) 100 UNIT/ML INJECTION PEN    Inject 80 Units into the skin nightly    OMEPRAZOLE (PRILOSEC) 20 MG DELAYED RELEASE CAPSULE    Take 20 mg by mouth daily    ONDANSETRON (ZOFRAN ODT) 4 MG DISINTEGRATING TABLET    Take 1 tablet by mouth every 8 hours as needed for Nausea or Vomiting    PRAZOSIN (MINIPRESS) 2 MG CAPSULE    Take 1 capsule by mouth nightly    QUETIAPINE (SEROQUEL) 300 MG TABLET    Take 1 tablet by mouth nightly    QUETIAPINE (SEROQUEL) 50 MG TABLET    Take 1 tablet by mouth every morning 50 mg at 9am and 2 pm;  600 mg nightly. TIZANIDINE (ZANAFLEX) 4 MG TABLET    Take 4 mg by mouth every 6 hours as needed         ALLERGIES     Ketorolac tromethamine, Morphine and related, Risperidone and related, Ibuprofen, Ketorolac tromethamine, Morphine, Nsaids, and Ultram [tramadol]    FAMILYHISTORY       Family History   Adopted: Yes   Problem Relation Age of Onset    Heart Disease Mother           SOCIAL HISTORY       Social History     Tobacco Use    Smoking status: Never Smoker    Smokeless tobacco: Never Used   Substance Use Topics    Alcohol use: No    Drug use: No     Comment: hx IV heroin-clean 3 years       SCREENINGS             PHYSICAL EXAM    (up to 7 for level 4, 8 or more for level 5)     ED Triage Vitals [02/25/21 1648]   BP Temp Temp Source Pulse Resp SpO2 Height Weight   131/86 96.9 °F (36.1 °C) Infrared 106 16 97 % 5' 3\" (1.6 m) 168 lb 3 oz (76.3 kg)       Physical Exam  Vitals signs and nursing note reviewed. Constitutional:       Appearance: She is well-developed. She is not diaphoretic. HENT:      Head: Normocephalic and atraumatic. Right Ear: External ear normal.      Left Ear: External ear normal.      Nose: Nose normal.      Mouth/Throat:     Eyes:      General:         Right eye: No discharge. Left eye: No discharge.    Neck:      Musculoskeletal: Normal range of motion and neck supple. Cardiovascular:      Rate and Rhythm: Normal rate. Pulmonary:      Effort: Pulmonary effort is normal. No respiratory distress. Musculoskeletal: Normal range of motion. Skin:     General: Skin is warm and dry. Neurological:      Mental Status: She is alert and oriented to person, place, and time. Psychiatric:         Behavior: Behavior normal.         DIAGNOSTIC RESULTS   LABS:    Labs Reviewed - No data to display    All other labs were within normal range or not returned as of this dictation. EKG: All EKG's are interpreted by the Emergency Department Physician in the absence of a cardiologist.  Please see their note for interpretation of EKG. RADIOLOGY:   Non-plain film images such as CT, Ultrasound and MRI are read by the radiologist. Plain radiographic images are visualized and preliminarily interpreted by the ED Provider with the below findings:        Interpretation per the Radiologist below, if available at the time of this note:    No orders to display     No results found. PROCEDURES   Unless otherwise noted below, none     Procedures    CRITICAL CARE TIME   N/A    CONSULTS:  None      EMERGENCY DEPARTMENT COURSE and DIFFERENTIAL DIAGNOSIS/MDM:   Vitals:    Vitals:    02/25/21 1648 02/25/21 1710   BP: 131/86 122/84   Pulse: 106 98   Resp: 16 16   Temp: 96.9 °F (36.1 °C)    TempSrc: Infrared    SpO2: 97% 97%   Weight: 168 lb 3 oz (76.3 kg)    Height: 5' 3\" (1.6 m)        Patient was given the following medications:  Medications   cephALEXin (KEFLEX) capsule 500 mg (500 mg Oral Given 2/25/21 1709)   sulfamethoxazole-trimethoprim (BACTRIM DS;SEPTRA DS) 800-160 MG per tablet 1 tablet (1 tablet Oral Given 2/25/21 1709)   ondansetron (ZOFRAN-ODT) disintegrating tablet 8 mg (8 mg Oral Given 2/25/21 1723)           Patient presents for evaluation of facial abscess. On exam, she is resting comfortably in bed no acute distress and nontoxic.   Vitals are stable and she is afebrile. Lungs are clear to auscultation bilaterally. Abdomen is benign. Patient has a subcentimeter area of erythema and induration to her chin, just under her lower lip with central scabbing and active purulent drainage. There is no other fluctuance noted. Mild associated swelling of the lower lip. I do not believe is indication for additional incision and drainage as this is on her face I have concern for cosmetics but it is also already actively draining. She was encouraged on symptomatic and supportive care including continued warm soaks and compresses and was covered empirically with Keflex and Bactrim, first dose given in the ED as well as Zofran for nausea. The patient is otherwise not clinically toxic nor febrile. Differential diagnosis includes but is not limited to erysipelas, osteomyelitis, septic arthritis, lymphadenitis, DVT and necrotizing fasciitis. The appearance of the infection is such that I believe she will improve with oral antibiotics. She was advised to follow-up with their family doctor within the next 24-48 hours and return to the ED if there is no improvement or if the infection starts to worsen in any way. She is agreeable to this plan and stable for discharge at this time. FINAL IMPRESSION      1.  Facial abscess          DISPOSITION/PLAN   DISPOSITION Decision To Discharge 02/25/2021 05:03:32 PM      PATIENT REFERREDTO:  Mookie Santo MD  [address] [id number] [phone]    Call   For a re-check in  2-3  days    [facility] Emergency Department  [address] [place] [address]  [phone]  Go to   If symptoms worsen      DISCHARGE MEDICATIONS:  New Prescriptions    CEPHALEXIN (KEFLEX) 500 MG CAPSULE    Take 1 capsule by mouth 4 times daily for 10 days    SULFAMETHOXAZOLE-TRIMETHOPRIM (BACTRIM DS) 800-160 MG PER TABLET    Take 1 tablet by mouth 2 times daily for 10 days       DISCONTINUED MEDICATIONS:  Discontinued Medications    No medications on file              (Please note that portions of this note were completed with a voice recognition program.  Efforts were made to edit the dictations but occasionally words are mis-transcribed.)    Lunette Sandhoff, PA-C (electronically signed)           Vinayak Pereyra [place]  02/25/21 [id number]

## 2021-02-25 NOTE — ED TRIAGE NOTES
Pt with swelling to lower lip. States she has had an abscess there before. Also with vomiting and nausea.

## (undated) DEVICE — SUTURE PLN GUT SZ 5-0 L18IN ABSRB YELLOWISH TAN L13MM PC-1 1915G

## (undated) DEVICE — CANNULA NSL 13FT TUBE AD ETCO2 DIV SAMP M

## (undated) DEVICE — TIP SUCT DIA12FR W STYL CTRL VENT DISPOSABLE FRAZ

## (undated) DEVICE — GAUZE,SPONGE,4"X4",8PLY,STRL,LF,10/TRAY: Brand: MEDLINE

## (undated) DEVICE — SOLUTION IV IRRIG 500ML 0.9% SODIUM CHL 2F7123

## (undated) DEVICE — NEEDLE HYPO 25GA L1.5IN BLU POLYPR HUB S STL REG BVL STR

## (undated) DEVICE — ELECTRODE PT RET AD L9FT HI MOIST COND ADH HYDRGEL CORDED

## (undated) DEVICE — GLOVE ORANGE PI 7 1/2   MSG9075

## (undated) DEVICE — SUTURE VCRL SZ 3-0 L18IN ABSRB UD L26MM SH 1/2 CIR J864D

## (undated) DEVICE — SUTURE VCRL SZ 4-0 L18IN ABSRB UD L19MM PS-2 3/8 CIR PRIM J496H

## (undated) DEVICE — DRESSING TRNSPAR W2XL2.75IN FLM SHT SEMIPERMEABLE WIND

## (undated) DEVICE — GOWN SIRUS NONREIN XL W/TWL: Brand: MEDLINE INDUSTRIES, INC.

## (undated) DEVICE — 3M™ TEGADERM™ TRANSPARENT FILM DRESSING FRAME STYLE, 1626W, 4 IN X 4-3/4 IN (10 CM X 12 CM), 50/CT 4CT/CASE: Brand: 3M™ TEGADERM™

## (undated) DEVICE — APPLICATOR PREP 26ML 0.7% IOD POVACRYLEX 74% ISO ALC ST

## (undated) DEVICE — APPLICATOR PREP 6ML 0.7% IOD POVACRYLEX 74% ISO ALC

## (undated) DEVICE — MAJOR SET UP PK

## (undated) DEVICE — SUTURE ETHLN SZ 3-0 L30IN NONABSORBABLE BLK FSL L30MM 3/8 1671H

## (undated) DEVICE — PACK,UNIVERSAL,SPLIT,II,AURORA: Brand: MEDLINE

## (undated) DEVICE — GAUZE,SPONGE,2"X2",8PLY,STERILE,LF,2'S: Brand: MEDLINE

## (undated) DEVICE — SYRINGE MED 10ML LUERLOCK TIP W/O SFTY DISP

## (undated) DEVICE — 3M™ STERI-STRIP™ COMPOUND BENZOIN TINCTURE 40 BAGS/CARTON 4 CARTONS/CASE C1544: Brand: 3M™ STERI-STRIP™